# Patient Record
Sex: MALE | Race: WHITE | NOT HISPANIC OR LATINO | Employment: OTHER | URBAN - METROPOLITAN AREA
[De-identification: names, ages, dates, MRNs, and addresses within clinical notes are randomized per-mention and may not be internally consistent; named-entity substitution may affect disease eponyms.]

---

## 2017-11-25 ENCOUNTER — APPOINTMENT (OUTPATIENT)
Dept: CT IMAGING | Facility: HOSPITAL | Age: 77
End: 2017-11-25

## 2017-11-25 ENCOUNTER — APPOINTMENT (OUTPATIENT)
Dept: GENERAL RADIOLOGY | Facility: HOSPITAL | Age: 77
End: 2017-11-25

## 2017-11-25 ENCOUNTER — HOSPITAL ENCOUNTER (EMERGENCY)
Facility: HOSPITAL | Age: 77
Discharge: HOME | End: 2017-11-25
Attending: EMERGENCY MEDICINE | Admitting: EMERGENCY MEDICINE

## 2017-11-25 VITALS
SYSTOLIC BLOOD PRESSURE: 177 MMHG | TEMPERATURE: 98.1 F | HEIGHT: 70 IN | HEART RATE: 118 BPM | WEIGHT: 148 LBS | BODY MASS INDEX: 21.19 KG/M2 | OXYGEN SATURATION: 97 % | RESPIRATION RATE: 18 BRPM | DIASTOLIC BLOOD PRESSURE: 105 MMHG

## 2017-11-25 DIAGNOSIS — E87.20 LACTIC ACIDOSIS: ICD-10-CM

## 2017-11-25 DIAGNOSIS — K59.00 OBSTIPATION: Primary | ICD-10-CM

## 2017-11-25 DIAGNOSIS — D72.829 LEUKOCYTOSIS, UNSPECIFIED TYPE: ICD-10-CM

## 2017-11-25 DIAGNOSIS — R73.9 HYPERGLYCEMIA: ICD-10-CM

## 2017-11-25 LAB
ALBUMIN SERPL-MCNC: 4.5 G/DL (ref 3.2–4.8)
ALBUMIN/GLOB SERPL: 1.4 G/DL (ref 1.5–2.5)
ALP SERPL-CCNC: 94 U/L (ref 25–100)
ALT SERPL W P-5'-P-CCNC: 14 U/L (ref 7–40)
ANION GAP SERPL CALCULATED.3IONS-SCNC: 10 MMOL/L (ref 3–11)
AST SERPL-CCNC: 15 U/L (ref 0–33)
BACTERIA UR QL AUTO: ABNORMAL /HPF
BASOPHILS # BLD AUTO: 0.05 10*3/MM3 (ref 0–0.2)
BASOPHILS NFR BLD AUTO: 0.5 % (ref 0–1)
BILIRUB SERPL-MCNC: 0.6 MG/DL (ref 0.3–1.2)
BILIRUB UR QL STRIP: NEGATIVE
BUN BLD-MCNC: 11 MG/DL (ref 9–23)
BUN/CREAT SERPL: 10 (ref 7–25)
CALCIUM SPEC-SCNC: 8.8 MG/DL (ref 8.7–10.4)
CHLORIDE SERPL-SCNC: 101 MMOL/L (ref 99–109)
CLARITY UR: CLEAR
CO2 SERPL-SCNC: 27 MMOL/L (ref 20–31)
COLOR UR: YELLOW
CREAT BLD-MCNC: 1.1 MG/DL (ref 0.6–1.3)
D-LACTATE SERPL-SCNC: 2.8 MMOL/L (ref 0.5–2)
DEPRECATED RDW RBC AUTO: 46.8 FL (ref 37–54)
EOSINOPHIL # BLD AUTO: 0.01 10*3/MM3 (ref 0–0.3)
EOSINOPHIL NFR BLD AUTO: 0.1 % (ref 0–3)
ERYTHROCYTE [DISTWIDTH] IN BLOOD BY AUTOMATED COUNT: 14.3 % (ref 11.3–14.5)
GFR SERPL CREATININE-BSD FRML MDRD: 65 ML/MIN/1.73
GLOBULIN UR ELPH-MCNC: 3.2 GM/DL
GLUCOSE BLD-MCNC: 164 MG/DL (ref 70–100)
GLUCOSE UR STRIP-MCNC: NEGATIVE MG/DL
HCT VFR BLD AUTO: 46.7 % (ref 38.9–50.9)
HGB BLD-MCNC: 15.1 G/DL (ref 13.1–17.5)
HGB UR QL STRIP.AUTO: NEGATIVE
HOLD SPECIMEN: NORMAL
HYALINE CASTS UR QL AUTO: ABNORMAL /LPF
IMM GRANULOCYTES # BLD: 0.04 10*3/MM3 (ref 0–0.03)
IMM GRANULOCYTES NFR BLD: 0.4 % (ref 0–0.6)
KETONES UR QL STRIP: ABNORMAL
LEUKOCYTE ESTERASE UR QL STRIP.AUTO: NEGATIVE
LIPASE SERPL-CCNC: 35 U/L (ref 6–51)
LYMPHOCYTES # BLD AUTO: 1.87 10*3/MM3 (ref 0.6–4.8)
LYMPHOCYTES NFR BLD AUTO: 16.9 % (ref 24–44)
MCH RBC QN AUTO: 28.8 PG (ref 27–31)
MCHC RBC AUTO-ENTMCNC: 32.3 G/DL (ref 32–36)
MCV RBC AUTO: 89.1 FL (ref 80–99)
MONOCYTES # BLD AUTO: 0.55 10*3/MM3 (ref 0–1)
MONOCYTES NFR BLD AUTO: 5 % (ref 0–12)
NEUTROPHILS # BLD AUTO: 8.56 10*3/MM3 (ref 1.5–8.3)
NEUTROPHILS NFR BLD AUTO: 77.1 % (ref 41–71)
NITRITE UR QL STRIP: NEGATIVE
PH UR STRIP.AUTO: 7.5 [PH] (ref 5–8)
PLATELET # BLD AUTO: 243 10*3/MM3 (ref 150–450)
PMV BLD AUTO: 8.7 FL (ref 6–12)
POTASSIUM BLD-SCNC: 4.4 MMOL/L (ref 3.5–5.5)
PROT SERPL-MCNC: 7.7 G/DL (ref 5.7–8.2)
PROT UR QL STRIP: ABNORMAL
RBC # BLD AUTO: 5.24 10*6/MM3 (ref 4.2–5.76)
RBC # UR: ABNORMAL /HPF
REF LAB TEST METHOD: ABNORMAL
SODIUM BLD-SCNC: 138 MMOL/L (ref 132–146)
SP GR UR STRIP: 1.01 (ref 1–1.03)
SQUAMOUS #/AREA URNS HPF: ABNORMAL /HPF
UROBILINOGEN UR QL STRIP: ABNORMAL
WBC NRBC COR # BLD: 11.08 10*3/MM3 (ref 3.5–10.8)
WBC UR QL AUTO: ABNORMAL /HPF
WHOLE BLOOD HOLD SPECIMEN: NORMAL
WHOLE BLOOD HOLD SPECIMEN: NORMAL

## 2017-11-25 PROCEDURE — 80053 COMPREHEN METABOLIC PANEL: CPT | Performed by: EMERGENCY MEDICINE

## 2017-11-25 PROCEDURE — 85025 COMPLETE CBC W/AUTO DIFF WBC: CPT | Performed by: EMERGENCY MEDICINE

## 2017-11-25 PROCEDURE — 83690 ASSAY OF LIPASE: CPT | Performed by: EMERGENCY MEDICINE

## 2017-11-25 PROCEDURE — 0 IOPAMIDOL 61 % SOLUTION: Performed by: EMERGENCY MEDICINE

## 2017-11-25 PROCEDURE — 74022 RADEX COMPL AQT ABD SERIES: CPT

## 2017-11-25 PROCEDURE — 99284 EMERGENCY DEPT VISIT MOD MDM: CPT

## 2017-11-25 PROCEDURE — 0 DIATRIZOATE MEGLUMINE & SODIUM PER 1 ML: Performed by: EMERGENCY MEDICINE

## 2017-11-25 PROCEDURE — 74177 CT ABD & PELVIS W/CONTRAST: CPT

## 2017-11-25 PROCEDURE — 0 DIATRIZOATE MEGLUMINE & SODIUM PER 1 ML

## 2017-11-25 PROCEDURE — 96360 HYDRATION IV INFUSION INIT: CPT

## 2017-11-25 PROCEDURE — 81001 URINALYSIS AUTO W/SCOPE: CPT | Performed by: EMERGENCY MEDICINE

## 2017-11-25 PROCEDURE — 83605 ASSAY OF LACTIC ACID: CPT | Performed by: EMERGENCY MEDICINE

## 2017-11-25 RX ORDER — SODIUM CHLORIDE 9 MG/ML
125 INJECTION, SOLUTION INTRAVENOUS CONTINUOUS
Status: DISCONTINUED | OUTPATIENT
Start: 2017-11-25 | End: 2017-11-25

## 2017-11-25 RX ORDER — SODIUM CHLORIDE 0.9 % (FLUSH) 0.9 %
10 SYRINGE (ML) INJECTION AS NEEDED
Status: DISCONTINUED | OUTPATIENT
Start: 2017-11-25 | End: 2017-11-25 | Stop reason: HOSPADM

## 2017-11-25 RX ORDER — ONDANSETRON 8 MG/1
8 TABLET, ORALLY DISINTEGRATING ORAL EVERY 8 HOURS PRN
Qty: 10 TABLET | Refills: 0 | Status: SHIPPED | OUTPATIENT
Start: 2017-11-25 | End: 2018-01-25

## 2017-11-25 RX ORDER — GLIPIZIDE 10 MG/1
10 TABLET ORAL DAILY
COMMUNITY

## 2017-11-25 RX ORDER — AMOXAPINE 50 MG/1
150 TABLET ORAL NIGHTLY
COMMUNITY
End: 2018-08-10 | Stop reason: DRUGHIGH

## 2017-11-25 RX ADMIN — DIATRIZOATE MEGLUMINE AND DIATRIZOATE SODIUM 15 ML: 660; 100 LIQUID ORAL; RECTAL at 16:05

## 2017-11-25 RX ADMIN — DIATRIZOATE MEGLUMINE AND DIATRIZOATE SODIUM 15 ML: 660; 100 LIQUID ORAL; RECTAL at 15:04

## 2017-11-25 RX ADMIN — SODIUM CHLORIDE 1000 ML: 9 INJECTION, SOLUTION INTRAVENOUS at 15:51

## 2017-11-25 RX ADMIN — IOPAMIDOL 85 ML: 612 INJECTION, SOLUTION INTRAVENOUS at 17:15

## 2017-11-25 NOTE — ED PROVIDER NOTES
Subjective   HPI Comments: Alexys Clancy is a 76 y.o.male who presents to the ED with c/o constipation with onset 10 days ago. He reports that he has tried taking Mag Citrate x3 bottles, Dulcolax x18 tabs, Castor oil, and a large jug of Golytely without any relief of his symptoms. The patient notes that he visited his PCP yesterday. He states that he has been experiencing a decreased appetite but denies fevers, nausea, vomiting, diarrhea, abnormal urinary symptoms, distention, or any other complaints at this time. The patient notes that he has a history of chronic constipation. The patient reports having several colonoscopies by Dr. Li, with the most recent showing clear results.    Patient is a 76 y.o. male presenting with constipation.   History provided by:  Patient  Constipation   Severity:  Moderate  Time since last bowel movement: 10 days.  Timing:  Constant  Progression:  Unchanged  Chronicity:  Chronic  Stool description:  None produced  Relieved by:  Nothing  Worsened by:  Nothing  Ineffective treatments: Mag Citrate x3 bottles, Dulcolax x18 tabs, Castor oil, and a large jug of Golytely.  Associated symptoms: no abdominal pain, no diarrhea, no dysuria, no fever, no nausea and no vomiting        Review of Systems   Constitutional: Positive for appetite change (Decreased). Negative for fever.   Gastrointestinal: Positive for constipation. Negative for abdominal distention, abdominal pain, diarrhea, nausea and vomiting.   Genitourinary: Negative for dysuria, frequency, hematuria and urgency.   All other systems reviewed and are negative.      Past Medical History:   Diagnosis Date   • Constipation    • Diabetes mellitus    • Enlarged prostate    • Skin cancer     left ear       No Known Allergies    Past Surgical History:   Procedure Laterality Date   • CYSTOSCOPY TRANSURETHRAL RESECTION OF PROSTATE     • EYE SURGERY     • HERNIA REPAIR     • UVULOPALATOPHARYNGOPLASTY         History reviewed. No pertinent  family history.    Social History     Social History   • Marital status:      Spouse name: N/A   • Number of children: N/A   • Years of education: N/A     Social History Main Topics   • Smoking status: Current Every Day Smoker     Packs/day: 0.50     Types: Cigarettes   • Smokeless tobacco: Never Used   • Alcohol use No   • Drug use: No   • Sexual activity: Defer     Other Topics Concern   • None     Social History Narrative   • None         Objective   Physical Exam   Constitutional: He is oriented to person, place, and time. He appears well-developed and well-nourished. No distress.   HENT:   Head: Normocephalic and atraumatic.   Nose: Nose normal.   Well hydrated. Pharynx is benign.   Eyes: Conjunctivae are normal. No scleral icterus.   Neck: Normal range of motion. Neck supple.   Cardiovascular: Normal rate, regular rhythm and normal heart sounds.  Exam reveals no gallop and no friction rub.    No murmur heard.  Pulmonary/Chest: Effort normal and breath sounds normal. No respiratory distress.   Abdominal: Soft. Bowel sounds are normal. He exhibits no distension and no mass. There is no tenderness. There is no rebound and no guarding.   No palpable fecal mass. Bowel sounds are great and intact bilaterally.   Genitourinary: Rectum normal.   Genitourinary Comments: Normal rectal tone. No masses or lesions. No palpable stool.   Musculoskeletal: Normal range of motion. He exhibits no tenderness.   Neurological: He is alert and oriented to person, place, and time.   Skin: Skin is warm and dry.   Psychiatric: He has a normal mood and affect. His behavior is normal.   Nursing note and vitals reviewed.      Procedures         ED Course  ED Course   Comment By Time   Patient is serially reevaluated throughout the ED course with last reevaluation now.  His lactate is elevated 2.8.  He had 40 ketones in his urine with glucose 164 no acidosis on chem profile.  White blood count is mildly elevated 11,000, but unchanged  "from previous.  He has a history of chronic leukocytosis but was told by Dr. López that is \"blood was fine\" and he needed no further evaluation for his chronic leukocytosis.Patient is voided well since IV hydration in the ED.  CT is pending, as he had no results from an enema, and no clear constipation on acute abdominal series.  He did have a fiber nodule pattern in the bases of his lungs that we'll further evaluate with CT additionally.Patient's primary care physician is Dr. Faith, and he also sees Dr. Li for colorectal care and his colonoscopies.Updated the patient and spouse with the plan of care.  All agree. Charles Hill MD 11/25 1710   Radiograph and CTs are reviewed with Dr. Taylor.  CT the abdomen and pelvis are unremarkable with both IV and oral contrast, with no stool visible, no signs of inflammation, and no other intra-abdominal or intrapelvic abnormality visualized.  The chest x-ray however is significant only change from 3 years ago with a fibroid nodule pattern in the base of the lungs, consistent with pulmonary fibrosis.The patient's reexamined with no abnormal tenderness no guarding rebound or peritoneal findings.  He reports that he still has the feeling of constipation but no other discomfort.  His lactate was mildly elevated and I ketones were elevated but he reports taking only the gallon of GoLYTELY yesterday with no other liquids that would actually hydrate him, and his had 3 cups of coffee but no other liquids today.  He denies any fevers chills or urinary symptoms.     I discussed hydration, follow-up with his PCP on Monday or Tuesday, and pulmonary follow-up with Dr. Vázquez who has seen him several years ago but not since.  I discussed the importance of smoking cessation and most importantly immediately return to the ED if symptoms worsen or do not progressively resolved after cessation of laxative use.Very pleasant and responsible patient and spouse verbalized understanding " agreement with the plan of care Charles Hill MD 11/25 1749     Recent Results (from the past 24 hour(s))   Comprehensive Metabolic Panel    Collection Time: 11/25/17  2:54 PM   Result Value Ref Range    Glucose 164 (H) 70 - 100 mg/dL    BUN 11 9 - 23 mg/dL    Creatinine 1.10 0.60 - 1.30 mg/dL    Sodium 138 132 - 146 mmol/L    Potassium 4.4 3.5 - 5.5 mmol/L    Chloride 101 99 - 109 mmol/L    CO2 27.0 20.0 - 31.0 mmol/L    Calcium 8.8 8.7 - 10.4 mg/dL    Total Protein 7.7 5.7 - 8.2 g/dL    Albumin 4.50 3.20 - 4.80 g/dL    ALT (SGPT) 14 7 - 40 U/L    AST (SGOT) 15 0 - 33 U/L    Alkaline Phosphatase 94 25 - 100 U/L    Total Bilirubin 0.6 0.3 - 1.2 mg/dL    eGFR Non African Amer 65 >60 mL/min/1.73    Globulin 3.2 gm/dL    A/G Ratio 1.4 (L) 1.5 - 2.5 g/dL    BUN/Creatinine Ratio 10.0 7.0 - 25.0    Anion Gap 10.0 3.0 - 11.0 mmol/L   Lipase    Collection Time: 11/25/17  2:54 PM   Result Value Ref Range    Lipase 35 6 - 51 U/L   Lactic Acid, Plasma    Collection Time: 11/25/17  2:54 PM   Result Value Ref Range    Lactate 2.8 (C) 0.5 - 2.0 mmol/L   CBC Auto Differential    Collection Time: 11/25/17  2:54 PM   Result Value Ref Range    WBC 11.08 (H) 3.50 - 10.80 10*3/mm3    RBC 5.24 4.20 - 5.76 10*6/mm3    Hemoglobin 15.1 13.1 - 17.5 g/dL    Hematocrit 46.7 38.9 - 50.9 %    MCV 89.1 80.0 - 99.0 fL    MCH 28.8 27.0 - 31.0 pg    MCHC 32.3 32.0 - 36.0 g/dL    RDW 14.3 11.3 - 14.5 %    RDW-SD 46.8 37.0 - 54.0 fl    MPV 8.7 6.0 - 12.0 fL    Platelets 243 150 - 450 10*3/mm3    Neutrophil % 77.1 (H) 41.0 - 71.0 %    Lymphocyte % 16.9 (L) 24.0 - 44.0 %    Monocyte % 5.0 0.0 - 12.0 %    Eosinophil % 0.1 0.0 - 3.0 %    Basophil % 0.5 0.0 - 1.0 %    Immature Grans % 0.4 0.0 - 0.6 %    Neutrophils, Absolute 8.56 (H) 1.50 - 8.30 10*3/mm3    Lymphocytes, Absolute 1.87 0.60 - 4.80 10*3/mm3    Monocytes, Absolute 0.55 0.00 - 1.00 10*3/mm3    Eosinophils, Absolute 0.01 0.00 - 0.30 10*3/mm3    Basophils, Absolute 0.05 0.00 - 0.20 10*3/mm3     Immature Grans, Absolute 0.04 (H) 0.00 - 0.03 10*3/mm3   Light Blue Top    Collection Time: 11/25/17  2:54 PM   Result Value Ref Range    Extra Tube hold for add-on    Green Top (Gel)    Collection Time: 11/25/17  2:54 PM   Result Value Ref Range    Extra Tube Hold for add-ons.    Lavender Top    Collection Time: 11/25/17  2:54 PM   Result Value Ref Range    Extra Tube hold for add-on    Gold Top - SST    Collection Time: 11/25/17  2:54 PM   Result Value Ref Range    Extra Tube Hold for add-ons.    Urinalysis With / Culture If Indicated - Urine, Clean Catch    Collection Time: 11/25/17  2:55 PM   Result Value Ref Range    Color, UA Yellow Yellow, Straw    Appearance, UA Clear Clear    pH, UA 7.5 5.0 - 8.0    Specific Gravity, UA 1.008 1.001 - 1.030    Glucose, UA Negative Negative    Ketones, UA 40 mg/dL (2+) (A) Negative    Bilirubin, UA Negative Negative    Blood, UA Negative Negative    Protein, UA 30 mg/dL (1+) (A) Negative    Leuk Esterase, UA Negative Negative    Nitrite, UA Negative Negative    Urobilinogen, UA 0.2 E.U./dL 0.2 - 1.0 E.U./dL   Urinalysis, Microscopic Only - Urine, Clean Catch    Collection Time: 11/25/17  2:55 PM   Result Value Ref Range    RBC, UA 0-2 None Seen, 0-2 /HPF    WBC, UA 0-2 (A) None Seen /HPF    Bacteria, UA None Seen None Seen, Trace /HPF    Squamous Epithelial Cells, UA None Seen None Seen, 0-2 /HPF    Hyaline Casts, UA None Seen 0 - 6 /LPF    Methodology Automated Microscopy      Note: In addition to lab results from this visit, the labs listed above may include labs taken at another facility or during a different encounter within the last 24 hours. Please correlate lab times with ED admission and discharge times for further clarification of the services performed during this visit.    CT Abdomen Pelvis With Contrast   Final Result   1. Basilar bilateral pulmonary fibrosis.   2. Negative images of the abdomen with no evidence of fecal stasis or   obstruction.       DICTATED:     " 11/25/2017   EDITED:          11/25/2017           This report was finalized on 11/25/2017 5:50 PM by Dr. Forest Taylor MD.          XR Abdomen 2 View With Chest 1 View   Final Result   There is a bibasilar fibronodular interstitial pulmonary   pattern which was not present on a previous chest x-ray dated   12/16/2014. The bowel gas pattern is unremarkable with no evidence of   mechanical bowel obstruction or perforation.       DICTATED:     11/25/2017   EDITED:          11/25/2017           This report was finalized on 11/25/2017 4:56 PM by Dr. Forest Taylor MD.            Vitals:    11/25/17 1400 11/25/17 1430 11/25/17 1735   BP: (!) 209/100 164/90 171/97   BP Location: Left arm     Patient Position: Sitting     Pulse: 118     Resp: 18     Temp: 98.1 °F (36.7 °C)     TempSrc: Oral     SpO2: 97% 97% 98%   Weight: 148 lb (67.1 kg)     Height: 70\" (177.8 cm)       Medications   sodium chloride 0.9 % flush 10 mL (not administered)   sodium chloride 0.9 % infusion (not administered)   diatrizoate meglumine-sodium (GASTROGRAFIN) 66-10 % solution 15 mL (15 mL Rectal Given 11/25/17 1504)   sodium chloride 0.9 % bolus 1,000 mL (0 mL Intravenous Stopped 11/25/17 1704)   diatrizoate meglumine-sodium (GASTROGRAFIN) 66-10 % solution 15 mL (15 mL Oral Given 11/25/17 1605)   iopamidol (ISOVUE-300) 61 % injection 85 mL (85 mL Intravenous Given 11/25/17 1715)     ECG/EMG Results (last 24 hours)     ** No results found for the last 24 hours. **                        MDM    Final diagnoses:   Obstipation   Lactic acidosis   Hyperglycemia   Leukocytosis, unspecified type       Documentation assistance provided by ash Shae.  Information recorded by the scribe was done at my direction and has been verified and validated by me.     Teressa Shea  11/25/17 1353       Charles Hill MD  11/25/17 3460    "

## 2017-11-25 NOTE — DISCHARGE INSTRUCTIONS
Do not use any laxatives for the next 6 days.  Push plenty fluids.  Use Zofran as needed for nausea    Follow-up with Dr. Faith and 48-72 hours for mandatory reevaluation and further care as needed.  Follow-up more promptly if your symptoms worsen or do not improve    Push clear liquids for the next 24 hours    Follow-up with Dr. Li if you have any persistent GI symptoms, for colorectal evaluation    Follow-up with Dr. Vázquez for pulmonary evaluation in view of the worsening findings on your x-ray from 3 years ago.    Stop smoking completely today.  He may use over-the-counter NicoDerm patches, Nicorette gum, and you may discuss additional medications with your primary care physician    Return to the ED if you have any significant worsening of your symptoms including onset of abdominal pain, fever with pain, or any other significant, acute, urgent, emergent, or progressive symptoms as discussed

## 2018-01-25 ENCOUNTER — OFFICE VISIT (OUTPATIENT)
Dept: PULMONOLOGY | Facility: CLINIC | Age: 78
End: 2018-01-25

## 2018-01-25 VITALS
SYSTOLIC BLOOD PRESSURE: 118 MMHG | HEIGHT: 68 IN | TEMPERATURE: 97.4 F | WEIGHT: 151 LBS | HEART RATE: 104 BPM | OXYGEN SATURATION: 98 % | BODY MASS INDEX: 22.88 KG/M2 | DIASTOLIC BLOOD PRESSURE: 70 MMHG

## 2018-01-25 DIAGNOSIS — R94.2 DECREASED DIFFUSION CAPACITY: ICD-10-CM

## 2018-01-25 DIAGNOSIS — R06.02 SOB (SHORTNESS OF BREATH): Primary | ICD-10-CM

## 2018-01-25 DIAGNOSIS — R93.89 ABNORMAL CHEST CT: ICD-10-CM

## 2018-01-25 DIAGNOSIS — J44.9 COPD MIXED TYPE (HCC): ICD-10-CM

## 2018-01-25 DIAGNOSIS — Z72.0 TOBACCO ABUSE: ICD-10-CM

## 2018-01-25 PROCEDURE — 94726 PLETHYSMOGRAPHY LUNG VOLUMES: CPT | Performed by: INTERNAL MEDICINE

## 2018-01-25 PROCEDURE — 94618 PULMONARY STRESS TESTING: CPT | Performed by: INTERNAL MEDICINE

## 2018-01-25 PROCEDURE — 94729 DIFFUSING CAPACITY: CPT | Performed by: INTERNAL MEDICINE

## 2018-01-25 PROCEDURE — 94375 RESPIRATORY FLOW VOLUME LOOP: CPT | Performed by: INTERNAL MEDICINE

## 2018-01-25 PROCEDURE — 99204 OFFICE O/P NEW MOD 45 MIN: CPT | Performed by: INTERNAL MEDICINE

## 2018-01-25 NOTE — PROGRESS NOTES
PULMONARY  NOTE    Chief Complaint     Abnormal CT scan of the chest, tobacco abuse    History of Present Illness     77-year-old white male referred for an abnormal CT scan of the abdomen and pelvis which included the bases of his lungs.  That film done in November 2017 was reported as showing pulmonary fibrotic disease.    The patient was experiencing severe constipation went to the emergency room in November.  He had a CT scan of the abdomen and pelvis.  The radiologist interpreted the bases of the lungs is showing fibrotic lung disease.  He is subsequently been referred here.    The patient has a long history tobacco abuse.  He used to smoke 2 packs of cigarettes per day but he is now cut down to 6 cigarettes a day.    He was seen Dr. Vázquez in the past in her office. Was somewhere around 2014.  He was diagnosed with emphysema at that time and prescribed an inhaler but he never tried the inhaler.    He doesn't feel limited by dyspnea on a daily basis.  He does well on a level surface.  He also feels that he can go up over a flight of stairs without having to stop due to shortness of breath.    He doesn't experience cough on a daily basis and has had no hemoptysis.    Patient Active Problem List   Diagnosis   • Emphysema with well preserved FEV1   • Tobacco abuse   • Abnormal chest CT   • Decreased diffusion capacity probably related to emphysema     No Known Allergies    Current Outpatient Prescriptions:   •  amoxapine (ASENDIN) 50 MG tablet, Take 150 mg by mouth Every Night., Disp: , Rfl:   •  glipiZIDE (GLUCOTROL) 10 MG tablet, Take 10 mg by mouth Daily., Disp: , Rfl:   •  metFORMIN (GLUCOPHAGE) 1000 MG tablet, Take 2,000 mg by mouth 2 (Two) Times a Day With Meals., Disp: , Rfl:   •  SITagliptin (JANUVIA) 100 MG tablet, Take 100 mg by mouth Daily., Disp: , Rfl:   MEDICATION LIST AND ALLERGIES REVIEWED.    Family History   Problem Relation Age of Onset   • Kidney disease Mother    • Parkinsonism Father    •  "Parkinsonism Sister    • Stroke Maternal Grandmother    • Stroke Maternal Grandfather      Social History   Substance Use Topics   • Smoking status: Current Every Day Smoker     Packs/day: 0.50     Types: Cigarettes   • Smokeless tobacco: Never Used   • Alcohol use No     Social History     Social History Narrative        Is a retired     Has smoked up to 2 packs of cigarettes per day but has cut back to 6 cigarettes a day    Doesn't drink alcohol     FAMILY AND SOCIAL HISTORY REVIEWED.    Review of Systems  ALSO REFER TO SCANNED ROS SHEET FROM SAME DATE.    /70 (BP Location: Right arm, Patient Position: Sitting, Cuff Size: Adult)  Pulse 104  Temp 97.4 °F (36.3 °C)  Ht 172.7 cm (68\")  Wt 68.5 kg (151 lb)  SpO2 98%  BMI 22.96 kg/m2  Physical Exam   Constitutional: He is oriented to person, place, and time. He appears well-developed. No distress.   HENT:   Head: Normocephalic and atraumatic.   Neck: No thyromegaly present.   Cardiovascular: Normal rate, regular rhythm and normal heart sounds.    No murmur heard.  Pulmonary/Chest: Effort normal. No stridor.   Decreased breath sounds bilaterally without wheezes and only a few crackles   Abdominal: Soft. Bowel sounds are normal.   Musculoskeletal: Normal range of motion.   Trace bilateral lower extremity edema.  Borderline clubbing   Lymphadenopathy:     He has no cervical adenopathy.        Right: No supraclavicular and no epitrochlear adenopathy present.        Left: No supraclavicular and no epitrochlear adenopathy present.   Neurological: He is alert and oriented to person, place, and time.   Skin: Skin is warm and dry. He is not diaphoretic.   Psychiatric: He has a normal mood and affect. His behavior is normal.   Nursing note and vitals reviewed.      Results     CT scan of the chest from the [facility] dated February 2014 was reviewed on PACS.  Diffuse emphysematous changes were noted without asked, " adenopathy, or overt fibrotic changes.    CT scan of the abdomen and pelvis from [facility] 11/25/2017 revealed no acute intra-abdominal process.  Bases of the lungs read demonstrated diffuse emphysematous changes and some fibrotic changes but not typical changes for IPF    PFTs reveal no airway obstruction, no restriction, and a reduced diffusion capacity.  However, peak expiratory effort was submaximal    Exercise pulse oximetry in the office failed to show significant excised induced desaturation to the point that would require supplemental oxygen    Problem List       ICD-10-CM ICD-9-CM   1. SOB (shortness of breath) R06.02 786.05   2. Emphysema with well preserved FEV1 J44.9 496   3. Tobacco abuse Z72.0 305.1   4. Abnormal chest CT R93.8 793.2   5. Decreased diffusion capacity probably related to emphysema R94.2 794.2       Discussion     We reviewed his test results.  They were kind enough to bring a CT scan of the chest from [facility] from February 2014 for comparison.  I don't really see any difference between the bases of his lungs on his recent CT scan of the abdomen and the CT scan of the chest from 2014.  The most remarkable finding is scattered, quite diffuse, emphysematous changes.  He does not have typical changes suggestive of IPF such as septal thickening or peripheral honeycombing.  Given his tobacco abuse history and concerns about interstitial lung disease, I have recommended both a low-dose CT scan of the chest and a high-resolution CT scan of the chest.    We did exercise pulse oximetry which failed to show significant exercise-induced desaturation.    We discussed the need for total smoking abstinence and we discussed smoking cessation strategies.    We will get an alpha 1 antitrypsin screen today.    In the past, he was given an inhaler by Dr. Vázquez but he never used it.  We talked about medical therapy again today.  He is not really interested in being on  an inhaler at this point.    I'll plan to see him back in 1-2 months after the above CT scan.    Jay Callahan MD  Note electronically signed    CC: Keo Faith MD

## 2018-01-29 ENCOUNTER — HOSPITAL ENCOUNTER (OUTPATIENT)
Dept: CT IMAGING | Facility: HOSPITAL | Age: 78
Discharge: HOME | End: 2018-01-29
Attending: INTERNAL MEDICINE | Admitting: INTERNAL MEDICINE

## 2018-01-29 DIAGNOSIS — R06.02 SOB (SHORTNESS OF BREATH): ICD-10-CM

## 2018-01-29 PROCEDURE — 71250 CT THORAX DX C-: CPT

## 2018-01-30 DIAGNOSIS — R91.8 LUNG MASS: Primary | ICD-10-CM

## 2018-01-31 NOTE — PROGRESS NOTES
HRCT reveals a LLL pleural based lesion that is larger than it was just a few months ago.   It is not in a location that can be biopsied easily. Even CECILIA bronchoscopy would be very difficult.   Dr Callahan has recommended a PET/CT for risk stratification.

## 2018-02-05 ENCOUNTER — HOSPITAL ENCOUNTER (OUTPATIENT)
Dept: PET IMAGING | Facility: HOSPITAL | Age: 78
Discharge: HOME | End: 2018-02-05
Attending: INTERNAL MEDICINE | Admitting: INTERNAL MEDICINE

## 2018-02-05 ENCOUNTER — HOSPITAL ENCOUNTER (OUTPATIENT)
Dept: PET IMAGING | Facility: HOSPITAL | Age: 78
Discharge: HOME | End: 2018-02-05
Attending: INTERNAL MEDICINE

## 2018-02-05 DIAGNOSIS — R91.8 LUNG MASS: ICD-10-CM

## 2018-02-05 LAB
GLUCOSE BLDC GLUCOMTR-MCNC: 189 MG/DL (ref 70–130)
GLUCOSE BLDC GLUCOMTR-MCNC: 209 MG/DL (ref 70–130)
GLUCOSE BLDC GLUCOMTR-MCNC: 219 MG/DL (ref 70–130)

## 2018-02-05 PROCEDURE — 82962 GLUCOSE BLOOD TEST: CPT

## 2018-02-05 PROCEDURE — 0 FLUDEOXYGLUCOSE F18 SOLUTION: Performed by: INTERNAL MEDICINE

## 2018-02-05 PROCEDURE — A9552 F18 FDG: HCPCS | Performed by: INTERNAL MEDICINE

## 2018-02-05 PROCEDURE — 78816 PET IMAGE W/CT FULL BODY: CPT

## 2018-02-05 RX ADMIN — FLUDEOXYGLUCOSE F18 1 DOSE: 300 INJECTION INTRAVENOUS at 13:33

## 2018-02-08 ENCOUNTER — TELEPHONE (OUTPATIENT)
Dept: PULMONOLOGY | Facility: CLINIC | Age: 78
End: 2018-02-08

## 2018-02-08 NOTE — TELEPHONE ENCOUNTER
PET/CT scan revealed no hypermetabolic activity, particularly in the area of the pulmonary nodule.  At this point, I would recommend a low-dose CT scan of the chest in 6 months.  I discussed these results with the patient on the phone who understands and agrees to come back in about 6 months.  A last my office staff to make the arrangements.

## 2018-02-09 DIAGNOSIS — R91.8 LUNG MASS: ICD-10-CM

## 2018-02-09 DIAGNOSIS — R93.89 ABNORMAL CHEST CT: Primary | ICD-10-CM

## 2018-02-09 NOTE — TELEPHONE ENCOUNTER
March appointment canceled.  Patient must be asymptomatic to meet criteria for low dose CT. Since he has a known lung mass, I've ordered a CT without contrast for 6 months.

## 2018-08-04 ENCOUNTER — HOSPITAL ENCOUNTER (OUTPATIENT)
Dept: CT IMAGING | Facility: HOSPITAL | Age: 78
Discharge: HOME | End: 2018-08-04
Admitting: NURSE PRACTITIONER

## 2018-08-04 DIAGNOSIS — R91.8 LUNG MASS: ICD-10-CM

## 2018-08-04 DIAGNOSIS — R93.89 ABNORMAL CHEST CT: ICD-10-CM

## 2018-08-04 PROCEDURE — 71250 CT THORAX DX C-: CPT

## 2018-08-10 ENCOUNTER — OFFICE VISIT (OUTPATIENT)
Dept: PULMONOLOGY | Facility: CLINIC | Age: 78
End: 2018-08-10

## 2018-08-10 VITALS
HEIGHT: 68 IN | SYSTOLIC BLOOD PRESSURE: 136 MMHG | HEART RATE: 99 BPM | WEIGHT: 145.5 LBS | TEMPERATURE: 97.3 F | BODY MASS INDEX: 22.05 KG/M2 | OXYGEN SATURATION: 97 % | RESPIRATION RATE: 18 BRPM | DIASTOLIC BLOOD PRESSURE: 70 MMHG

## 2018-08-10 DIAGNOSIS — Z72.0 TOBACCO ABUSE: ICD-10-CM

## 2018-08-10 DIAGNOSIS — J44.9 COPD MIXED TYPE (HCC): Primary | ICD-10-CM

## 2018-08-10 DIAGNOSIS — R93.89 ABNORMAL CHEST CT: ICD-10-CM

## 2018-08-10 PROCEDURE — 99214 OFFICE O/P EST MOD 30 MIN: CPT | Performed by: NURSE PRACTITIONER

## 2018-08-10 RX ORDER — ARIPIPRAZOLE 2 MG/1
2 TABLET ORAL DAILY
COMMUNITY
Start: 2018-08-09 | End: 2020-03-19

## 2018-08-10 RX ORDER — AMOXAPINE 100 MG/1
100 TABLET ORAL 3 TIMES DAILY
COMMUNITY
Start: 2018-08-09

## 2018-08-10 RX ORDER — METFORMIN HYDROCHLORIDE 500 MG/1
500 TABLET, EXTENDED RELEASE ORAL 2 TIMES DAILY
COMMUNITY
Start: 2018-06-25

## 2018-08-10 NOTE — PROGRESS NOTES
[facility] Pulmonary Follow up    CHIEF COMPLAINT    Emphysema, tobacco abuse, lung nodule    HISTORY OF PRESENT ILLNESS    Alexys Clancy is a 77 y.o.male current smoker with emphysema and known lung nodule here today for follow-up after CT scan.    He had a CT of abdomen and pelvis in November 2017 which demonstrated some basilar fibrotic disease.  He was evaluated by Dr. Callahan in January.  He underwent a high-resolution CT of the chest which revealed an enlarging left lower lobe pleural-based nodule.  PET scan in February was negative.  He recently underwent a 6 month follow-up CT of the chest and returns today to discuss results.    He has a significant smoking history.  He has smoked for almost 60 years upwards of 2 packs per day.  He is now smoking just 6 cigarettes per day but has been unsuccessful in total smoking abstinence.  Despite his long-standing tobacco abuse, he has preserved lung function.  He doesn't feel limited by dyspnea and is not interested in any inhaled therapy.    Patient Active Problem List   Diagnosis   • Emphysema with well preserved FEV1   • Tobacco abuse   • Abnormal chest CT   • Decreased diffusion capacity probably related to emphysema       No Known Allergies    Current Outpatient Prescriptions:   •  amoxapine (ASENDIN) 100 MG tablet, Take 100 mg by mouth 3 (Three) Times a Day., Disp: , Rfl:   •  ARIPiprazole (ABILIFY) 2 MG tablet, Take 2 mg by mouth Daily., Disp: , Rfl:   •  glipiZIDE (GLUCOTROL) 10 MG tablet, Take 10 mg by mouth Daily., Disp: , Rfl:   •  metFORMIN ER (GLUCOPHAGE-XR) 500 MG 24 hr tablet, Take 500 mg by mouth 2 (Two) Times a Day., Disp: , Rfl:   •  SITagliptin (JANUVIA) 100 MG tablet, Take 100 mg by mouth Daily., Disp: , Rfl:   MEDICATION LIST AND ALLERGIES REVIEWED.    Social History   Substance Use Topics   • Smoking status: Current Every Day Smoker     Packs/day: 0.50     Types: Cigarettes   • Smokeless tobacco: Never Used   • Alcohol use No       FAMILY AND SOCIAL  "HISTORY REVIEWED.    Review of Systems   Constitutional: Negative for chills, fatigue, fever and unexpected weight change.   HENT: Negative for congestion, nosebleeds, postnasal drip, rhinorrhea, sinus pressure and trouble swallowing.    Respiratory: Negative for cough, chest tightness, shortness of breath and wheezing.    Cardiovascular: Negative for chest pain and leg swelling.   Gastrointestinal: Negative for abdominal pain, constipation, diarrhea, nausea and vomiting.   Genitourinary: Negative for dysuria, frequency, hematuria and urgency.   Musculoskeletal: Negative for myalgias.   Neurological: Negative for dizziness, weakness, numbness and headaches.   All other systems reviewed and are negative.  .    /70 (BP Location: Left arm, Patient Position: Sitting, Cuff Size: Adult)   Pulse 99   Temp 97.3 °F (36.3 °C)   Resp 18   Ht 172.7 cm (68\")   Wt 66 kg (145 lb 8 oz)   SpO2 97%   BMI 22.12 kg/m²     Immunization History   Administered Date(s) Administered   • Flu Vaccine High Dose PF 65YR+ 09/29/2017   • Pneumococcal Polysaccharide (PPSV23) 10/01/2014       Physical Exam   Constitutional: He is oriented to person, place, and time. He appears well-developed. No distress.   HENT:   Head: Normocephalic and atraumatic.   Neck: No thyromegaly present.   Cardiovascular: Normal rate, regular rhythm and normal heart sounds.    No murmur heard.  Pulmonary/Chest: Effort normal. No stridor.   Decreased breath sounds bilaterally without wheezes and only a few crackles   Abdominal: Soft. Bowel sounds are normal.   Musculoskeletal: Normal range of motion.   Trace bilateral lower extremity edema.  Borderline clubbing   Lymphadenopathy:     He has no cervical adenopathy.        Right: No supraclavicular and no epitrochlear adenopathy present.        Left: No supraclavicular and no epitrochlear adenopathy present.   Neurological: He is alert and oriented to person, place, and time.   Skin: Skin is warm and dry. He is " not diaphoretic.   Psychiatric: He has a normal mood and affect. His behavior is normal.   Nursing note and vitals reviewed.        RESULTS    Ct Chest Without Contrast    Result Date: 8/4/2018  1. 14 mm noncalcified left lower lobe pulmonary nodule. Lung RADS Category 4B with recommended PET/CT and/or tissue sampling evaluation. 2. Enlarged AP window lymph node measuring 16 mm in long axis dimension. 3. Severe background emphysematous change of the lungs.  DICTATED:   8/4/2018 EDITED/ls :   8/4/2018  This report was finalized on 8/4/2018 6:10 PM by Dr. Timbo Daugherty.        PROBLEM LIST    Problem List Items Addressed This Visit        Respiratory    Emphysema with well preserved FEV1 - Primary       Other    Tobacco abuse    Relevant Orders    CT Chest Without Contrast    Abnormal chest CT    Relevant Orders    CT Chest Without Contrast            DISCUSSION    We reviewed his recent CT of the chest.  His 14 mm noncalcified left lower lobe pulmonary nodule is unchanged and was negative on PET/CT in February.  Will get a follow-up CT in 1 year.    I encouraged total smoking abstinence.  He is not interested in any pharmacological smoking cessation aids.    He is still not interested in any inhalers.    Follow-up with Dr. Callahan in one year after CT of chest.    I spent 25 minutes with the patient. I spent > 50% percent of this time counseling and discussing diagnosis, diagnostic testing, current status and treatment options.    FAROOQ Hernández  08/10/83675:12 PM  Electronically signed     Please note that portions of this note were completed with a voice recognition program. Efforts were made to edit the dictations, but occasionally words are mistranscribed.      CC: Keo Faith MD

## 2019-08-09 ENCOUNTER — HOSPITAL ENCOUNTER (OUTPATIENT)
Dept: CT IMAGING | Facility: HOSPITAL | Age: 79
Discharge: HOME | End: 2019-08-09
Admitting: NURSE PRACTITIONER

## 2019-08-09 DIAGNOSIS — R93.89 ABNORMAL CHEST CT: ICD-10-CM

## 2019-08-09 DIAGNOSIS — Z72.0 TOBACCO ABUSE: ICD-10-CM

## 2019-08-09 PROCEDURE — 71250 CT THORAX DX C-: CPT

## 2019-09-11 ENCOUNTER — OFFICE VISIT (OUTPATIENT)
Dept: PULMONOLOGY | Facility: CLINIC | Age: 79
End: 2019-09-11

## 2019-09-11 VITALS
HEART RATE: 97 BPM | OXYGEN SATURATION: 96 % | SYSTOLIC BLOOD PRESSURE: 138 MMHG | DIASTOLIC BLOOD PRESSURE: 72 MMHG | HEIGHT: 68 IN | WEIGHT: 139.13 LBS | RESPIRATION RATE: 16 BRPM | BODY MASS INDEX: 21.09 KG/M2

## 2019-09-11 DIAGNOSIS — R93.89 ABNORMAL CHEST CT: Primary | ICD-10-CM

## 2019-09-11 DIAGNOSIS — J44.9 COPD MIXED TYPE (HCC): ICD-10-CM

## 2019-09-11 DIAGNOSIS — Z72.0 TOBACCO ABUSE: ICD-10-CM

## 2019-09-11 PROCEDURE — 99213 OFFICE O/P EST LOW 20 MIN: CPT | Performed by: NURSE PRACTITIONER

## 2019-09-11 NOTE — PROGRESS NOTES
[facility] Pulmonary Follow up    CHIEF COMPLAINT    Abnormal CT follow up, Left Lower lobe nodule     Subjective   HISTORY OF PRESENT ILLNESS    Alexys Clancy is a 78 y.o.male here today for annual follow-up on his abnormal CT scan.  He was found to have a nodule in the left lower lobe initially seen on a CT scan of the abdomen and pelvis in November 2017, at that time it was 11 mm on follow-up CT scan of the chest in January it was 14 mm.  He did have a PET scan in February 2018 which was negative.The left lower lobe nodule had a maximum SUV of 0.90.  Therefore he had another surveillance CT scan in 6 months in August 2018 that again showed stability of the nodule.  He comes in today for his annual follow-up.    We reviewed today's scan it does show an enlarging lobulated noncalcified pulmonary nodule in the left lower lobe currently at 17 mm.    He does continue to smoke.  He has quite extensive emphysema with subpleural reticulation in bed and formation.  His PFTs in 2018 showed no obstruction with an FEV1 of 2.47, 90% predicted.  He denies any shortness of breath, cough, sputum production chest tightness or wheezing.  He will get dyspneic but it takes moderate activity.    Patient Active Problem List   Diagnosis   • Emphysema with well preserved FEV1   • Tobacco abuse   • Abnormal chest CT   • Decreased diffusion capacity probably related to emphysema       No Known Allergies    Current Outpatient Medications:   •  amoxapine (ASENDIN) 100 MG tablet, Take 100 mg by mouth 3 (Three) Times a Day., Disp: , Rfl:   •  ARIPiprazole (ABILIFY) 2 MG tablet, Take 2 mg by mouth Daily., Disp: , Rfl:   •  glipiZIDE (GLUCOTROL) 10 MG tablet, Take 10 mg by mouth Daily., Disp: , Rfl:   •  metFORMIN ER (GLUCOPHAGE-XR) 500 MG 24 hr tablet, Take 500 mg by mouth 2 (Two) Times a Day., Disp: , Rfl:   •  SITagliptin (JANUVIA) 100 MG tablet, Take 100 mg by mouth Daily., Disp: , Rfl:   MEDICATION LIST AND ALLERGIES REVIEWED.    Social History  "    Tobacco Use   • Smoking status: Current Every Day Smoker     Packs/day: 0.50     Types: Cigarettes   • Smokeless tobacco: Never Used   Substance Use Topics   • Alcohol use: No   • Drug use: No       FAMILY AND SOCIAL HISTORY REVIEWED.    Review of Systems   Constitutional: Negative for chills, fatigue, fever and unexpected weight change.   HENT: Negative for congestion, nosebleeds, postnasal drip, rhinorrhea, sinus pressure and trouble swallowing.    Respiratory: Negative for cough, chest tightness, shortness of breath and wheezing.    Cardiovascular: Negative for chest pain and leg swelling.   Gastrointestinal: Negative for abdominal pain, constipation, diarrhea, nausea and vomiting.   Genitourinary: Negative for dysuria, frequency, hematuria and urgency.   Musculoskeletal: Negative for myalgias.   Neurological: Negative for dizziness, weakness, numbness and headaches.   All other systems reviewed and are negative.  .  Objective   /72   Pulse 97   Resp 16   Ht 172.7 cm (68\")   Wt 63.1 kg (139 lb 2 oz)   SpO2 96% Comment: Resting Room Air  BMI 21.15 kg/m²      Immunization History   Administered Date(s) Administered   • Flu Vaccine High Dose PF 65YR+ 09/29/2017   • Pneumococcal Polysaccharide (PPSV23) 10/01/2014       Physical Exam   Constitutional: He is oriented to person, place, and time. He appears well-developed and well-nourished.   HENT:   Head: Normocephalic and atraumatic.   Eyes: EOM are normal. Pupils are equal, round, and reactive to light.   Neck: Normal range of motion. Neck supple.   Cardiovascular: Normal rate and regular rhythm.   No murmur heard.  Pulmonary/Chest: Effort normal. No respiratory distress. He has no wheezes. He has no rales.   Decreased, no wheezing or rales   Abdominal: Soft. Bowel sounds are normal. He exhibits no distension.   Musculoskeletal: Normal range of motion. He exhibits no edema.   Neurological: He is alert and oriented to person, place, and time.   Skin: " Skin is warm and dry. No erythema.   Psychiatric: He has a normal mood and affect. His behavior is normal.   Vitals reviewed.        RESULTS      EXAMINATION: CT CHEST WO CONTRAST - 08/09/2019     INDICATION: R93.89-Abnormal findings on diagnostic imaging of other  specified body structures; Z72.0-Tobacco use.      TECHNIQUE: CT chest without intravenous contrast administration.     The radiation dose reduction device was turned on for each scan per the  ALARA (As Low as Reasonably Achievable) protocol.     COMPARISON: CT dated 08/04/2018.     FINDINGS: Thyroid is homogeneous in attenuation. Mildly enlarged  mediastinal lymph nodes similar to prior. Central airways are patent.  Esophagus in normal course. Atherosclerotic nonaneurysmal thoracic  aorta. Cardiac size within normal limits and without pericardial  effusion demonstrating coronary calcifications. Extended lung windows  demonstrate severe centrilobular emphysema with subpleural reticulation  and band formation as well as honeycombing present of fibrotic change  along with a mildly lobulated noncalcified pulmonary nodule at the  medial segment left lower lobe enlargement from prior comparison 17 mm  versus previously 13 mm. No pleural effusion. Degenerative changes of  the spine without aggressive osseous lesion. No soft tissue body wall  findings of concern specifically no bulky axillary adenopathy. Visual is  portions of the upper abdomen unremarkable.     IMPRESSION:  Enlarging lobulated noncalcified pulmonary nodule medial  segment left lower lobe from prior comparison. Lung RADS Category 4B  with recommend PET/CT evaluation. Extensive background chronic changes.     DICTATED:   08/09/2019  EDITED/ls :   08/11/2019      This report was finalized on 8/11/2019 5:20 PM by Dr. Timbo Daugherty.              Assessment/Plan     PROBLEM LIST    Problem List Items Addressed This Visit        Respiratory    Emphysema with well preserved FEV1       Other    Tobacco  abuse    Abnormal chest CT - Primary    Relevant Orders    CT Chest Without Contrast            DISCUSSION    We discussed that the nodule has enlarged somewhat.  Due to his high risk factors we will go ahead and follow that up in 6 months.  He has had a negative PET with an SUV of 0.90 in the past.    We did discuss the possibility of a biopsy if it continues to enlarge, or a repeat PET scan.    Follow-up with Dr. Callahan on his repeat CT of the chest.    I spent 15 minutes with the patient. I spent > 50% percent of this time counseling and discussing diagnostic testing, evaluation and management.    Helen Caruso, APRN  09/11/20191:59 PM  Electronically signed     Please note that portions of this note were completed with a voice recognition program. Efforts were made to edit the dictations, but occasionally words are mistranscribed.      CC: Keo Faith MD

## 2020-02-14 ENCOUNTER — TRANSCRIBE ORDERS (OUTPATIENT)
Dept: PULMONOLOGY | Facility: CLINIC | Age: 80
End: 2020-02-14

## 2020-02-17 ENCOUNTER — HOSPITAL ENCOUNTER (OUTPATIENT)
Dept: CT IMAGING | Facility: HOSPITAL | Age: 80
Discharge: HOME | End: 2020-02-17
Admitting: NURSE PRACTITIONER

## 2020-02-17 DIAGNOSIS — R93.89 ABNORMAL CHEST CT: ICD-10-CM

## 2020-02-17 PROCEDURE — 71250 CT THORAX DX C-: CPT

## 2020-03-19 ENCOUNTER — OFFICE VISIT (OUTPATIENT)
Dept: PULMONOLOGY | Facility: CLINIC | Age: 80
End: 2020-03-19

## 2020-03-19 VITALS
SYSTOLIC BLOOD PRESSURE: 124 MMHG | HEIGHT: 68 IN | BODY MASS INDEX: 21.13 KG/M2 | WEIGHT: 139.4 LBS | OXYGEN SATURATION: 97 % | TEMPERATURE: 98.8 F | DIASTOLIC BLOOD PRESSURE: 78 MMHG | HEART RATE: 99 BPM

## 2020-03-19 DIAGNOSIS — J44.9 COPD MIXED TYPE (HCC): ICD-10-CM

## 2020-03-19 DIAGNOSIS — Z72.0 TOBACCO ABUSE: ICD-10-CM

## 2020-03-19 DIAGNOSIS — R93.89 ABNORMAL CHEST CT: Primary | ICD-10-CM

## 2020-03-19 PROCEDURE — 99214 OFFICE O/P EST MOD 30 MIN: CPT | Performed by: INTERNAL MEDICINE

## 2020-03-19 NOTE — PROGRESS NOTES
PULMONARY  NOTE    Chief Complaint     Tobacco abuse, left lower lobe nodule, COPD with preserved lung function    History of Present Illness     79-year-old white male returns today for follow-up.  He was last seen in the office by FAROOQ Hyatt on 9/11/2019    He has a pulmonary nodule in the left lower lobe that we have followed since November 2017.  This was initially identified on a CT scan of the abdomen and pelvis done in November 2017.  At that point it was characterized as an 11 mm nodule.  A follow-up CT scan of the chest done in January 2018 was felt to represent 14 mm in size.  A PET CT scan done in February 2018 revealed no abnormal hypermetabolic activity.    Subsequent serial CT scans of the chest most recently done in August 2019 and again in February 2020 have revealed a lesion of 17 mm in 2019 and 16 mm most recently.  Diffuse changes of obstructive airways disease are seen, as well but no other suspicious or abnormal lesions.    Patient has no hemoptysis.    He has a daily cough.    His wife indicates that he makes noise when he breathes but he does not really notice it.  He denies dyspnea on exertion.    He has a long history of tobacco abuse that is ongoing.    Patient Active Problem List   Diagnosis   • Emphysema with well preserved FEV1   • Tobacco abuse   • Abnormal chest CT (< 2cmm pulmonary nodule LLL)   • Decreased diffusion capacity probably related to emphysema     No Known Allergies    Current Outpatient Medications:   •  amoxapine (ASENDIN) 100 MG tablet, Take 100 mg by mouth 3 (Three) Times a Day., Disp: , Rfl:   •  glipiZIDE (GLUCOTROL) 10 MG tablet, Take 10 mg by mouth Daily., Disp: , Rfl:   •  metFORMIN ER (GLUCOPHAGE-XR) 500 MG 24 hr tablet, Take 500 mg by mouth 2 (Two) Times a Day., Disp: , Rfl:   •  SITagliptin (JANUVIA) 100 MG tablet, Take 100 mg by mouth Daily., Disp: , Rfl:   •  ciclopirox (PENLAC) 8 % solution, , Disp: , Rfl:   MEDICATION LIST AND ALLERGIES  "REVIEWED.    Family History   Problem Relation Age of Onset   • Kidney disease Mother    • Parkinsonism Father    • Parkinsonism Sister    • Stroke Maternal Grandmother    • Stroke Maternal Grandfather      Social History     Tobacco Use   • Smoking status: Current Every Day Smoker     Packs/day: 0.50     Types: Cigarettes   • Smokeless tobacco: Never Used   Substance Use Topics   • Alcohol use: No   • Drug use: No     Social History     Social History Narrative        Is a retired     Has smoked up to 2 packs of cigarettes per day but has cut back to 6 cigarettes a day    Doesn't drink alcohol     FAMILY AND SOCIAL HISTORY REVIEWED.    Review of Systems  ALSO REFER TO SCANNED ROS SHEET FROM SAME DATE.    /78 (BP Location: Left arm, Patient Position: Sitting, Cuff Size: Adult)   Pulse 99   Temp 98.8 °F (37.1 °C) (Temporal)   Ht 172.7 cm (68\")   Wt 63.2 kg (139 lb 6.4 oz)   SpO2 97% Comment: room air seated  BMI 21.20 kg/m²   Physical Exam   Constitutional: He is oriented to person, place, and time. He appears well-developed. No distress.   HENT:   Head: Normocephalic and atraumatic.   Neck: No thyromegaly present.   Cardiovascular: Normal rate, regular rhythm and normal heart sounds.   No murmur heard.  Pulmonary/Chest: Effort normal. No stridor.   A few wheezes and rhonchi that clear with cough   Abdominal: Soft. Bowel sounds are normal.   Musculoskeletal: Normal range of motion. He exhibits no edema.   Lymphadenopathy:     He has no cervical adenopathy.        Right: No supraclavicular and no epitrochlear adenopathy present.        Left: No supraclavicular and no epitrochlear adenopathy present.   Neurological: He is alert and oriented to person, place, and time.   Skin: Skin is warm and dry. He is not diaphoretic.   Psychiatric: He has a normal mood and affect. His behavior is normal.   Nursing note and vitals reviewed.      Results     CT scan of the chest from 2/17/2020 " reviewed on PACS.  Diffuse emphysematous changes with a roughly 16 mm left lower lobe noncalcified pulmonary nodule    Problem List       ICD-10-CM ICD-9-CM   1. Abnormal chest CT (< 2cmm pulmonary nodule LLL) R93.89 793.2   2. Emphysema with well preserved FEV1 J44.9 496   3. Tobacco abuse Z72.0 305.1       Discussion     We reviewed his CT scan findings.  There have been a variety of measurements associated with this lesion,  I think in part because of its irregular and lobulated shape.  However, there has not been clear-cut enlargement on the last several scans and PET scan in 2018 was negative.  I am still cautiously optimistic this represents a benign process.  We will continue to follow her closely with serial chest imaging.    We discussed the need for total smoking abstinence.    Although he has preserved lung function, we discussed PRN albuterol.  He indicates that he does not really feel short of breath on a regular basis and deferred.    I will plan to see him back in 6 months with a repeat CT scan of the chest  Jay Callahan MD  Note electronically signed    CC: Keo Faith MD

## 2020-03-20 DIAGNOSIS — R93.89 ABNORMAL CHEST CT: Primary | ICD-10-CM

## 2020-09-01 ENCOUNTER — HOSPITAL ENCOUNTER (OUTPATIENT)
Dept: CT IMAGING | Facility: HOSPITAL | Age: 80
Discharge: HOME | End: 2020-09-01
Admitting: INTERNAL MEDICINE

## 2020-09-01 ENCOUNTER — APPOINTMENT (OUTPATIENT)
Dept: CT IMAGING | Facility: HOSPITAL | Age: 80
End: 2020-09-01

## 2020-09-01 DIAGNOSIS — R93.89 ABNORMAL CHEST CT: ICD-10-CM

## 2020-09-01 PROCEDURE — 71250 CT THORAX DX C-: CPT

## 2020-10-01 ENCOUNTER — OFFICE VISIT (OUTPATIENT)
Dept: PULMONOLOGY | Facility: CLINIC | Age: 80
End: 2020-10-01

## 2020-10-01 VITALS
BODY MASS INDEX: 20.19 KG/M2 | HEIGHT: 68 IN | WEIGHT: 133.25 LBS | RESPIRATION RATE: 18 BRPM | DIASTOLIC BLOOD PRESSURE: 80 MMHG | HEART RATE: 94 BPM | TEMPERATURE: 97.3 F | SYSTOLIC BLOOD PRESSURE: 142 MMHG | OXYGEN SATURATION: 98 %

## 2020-10-01 DIAGNOSIS — R94.2 DECREASED DIFFUSION CAPACITY: ICD-10-CM

## 2020-10-01 DIAGNOSIS — Z72.0 TOBACCO ABUSE: ICD-10-CM

## 2020-10-01 DIAGNOSIS — R93.89 ABNORMAL CHEST CT: Primary | ICD-10-CM

## 2020-10-01 DIAGNOSIS — J44.9 COPD MIXED TYPE (HCC): ICD-10-CM

## 2020-10-01 PROCEDURE — 99214 OFFICE O/P EST MOD 30 MIN: CPT | Performed by: INTERNAL MEDICINE

## 2020-10-01 RX ORDER — AMOXAPINE 150 MG/1
150 TABLET ORAL NIGHTLY
COMMUNITY
Start: 2020-08-28

## 2020-10-01 NOTE — PROGRESS NOTES
PULMONARY  NOTE    Chief Complaint     Tobacco abuse, left lower lobe nodule, emphysema with well-preserved lung function    History of Present Illness     79-year-old white male returns today for follow-up  He was last seen in the office on 3/19/2020    He has a pulmonary nodule that is been followed since November 2017  This was initially identified on a CT scan of the abdomen and pelvis  At that time it was characterized as an 11 mm pulmonary nodule  Follow-up CT scan of the chest in January 2018 reported the lesion at 14 mm in size  A PET CT scan done in February 2018 revealed no abnormal hypermetabolic activity  Subsequent CT scan of the chest in August 2019 again in February 2020 revealed a lesion at 17 mm  Most recent chest imaging has shown the lesion to be at 16 mm, including the most recent scan done 9/1/2020    Patient continues to smoke does not have any plans for smoking cessation    He denies dyspnea on exertion and does not feel limited by shortness of breath    He has no cough or sputum production and has had no hemoptysis.    Patient Active Problem List   Diagnosis   • Emphysema with well preserved FEV1   • Tobacco abuse   • Abnormal chest CT (< 2cm pulmonary nodule LLL)   • Decreased diffusion capacity probably related to emphysema     No Known Allergies    Current Outpatient Medications:   •  amoxapine (ASENDIN) 100 MG tablet, Take 100 mg by mouth 3 (Three) Times a Day., Disp: , Rfl:   •  amoxapine (ASENDIN) 150 MG tablet, Take 150 mg by mouth Every Night., Disp: , Rfl:   •  ciclopirox (PENLAC) 8 % solution, , Disp: , Rfl:   •  glipiZIDE (GLUCOTROL) 10 MG tablet, Take 10 mg by mouth Daily., Disp: , Rfl:   •  metFORMIN ER (GLUCOPHAGE-XR) 500 MG 24 hr tablet, Take 500 mg by mouth 2 (Two) Times a Day., Disp: , Rfl:   •  SITagliptin (JANUVIA) 100 MG tablet, Take 100 mg by mouth Daily., Disp: , Rfl:   MEDICATION LIST AND ALLERGIES REVIEWED.    Family History   Problem Relation Age of Onset   • Kidney  "disease Mother    • Parkinsonism Father    • Parkinsonism Sister    • Stroke Maternal Grandmother    • Stroke Maternal Grandfather      Social History     Tobacco Use   • Smoking status: Current Every Day Smoker     Packs/day: 0.50     Years: 64.00     Pack years: 32.00     Types: Cigarettes   • Smokeless tobacco: Never Used   Substance Use Topics   • Alcohol use: No   • Drug use: No     Social History     Social History Narrative        Is a retired     Has smoked up to 2 packs of cigarettes per day but has cut back to 6 cigarettes a day    Doesn't drink alcohol     FAMILY AND SOCIAL HISTORY REVIEWED.    Review of Systems  ALSO REFER TO SCANNED ROS SHEET FROM SAME DATE.    /80 (BP Location: Left arm, Patient Position: Sitting, Cuff Size: Adult)   Pulse 94   Temp 97.3 °F (36.3 °C)   Resp 18   Ht 172.7 cm (68\")   Wt 60.4 kg (133 lb 4 oz)   SpO2 98% Comment: room air at rest  BMI 20.26 kg/m²   Physical Exam  Vitals signs and nursing note reviewed.   Constitutional:       General: He is not in acute distress.     Appearance: He is well-developed. He is not diaphoretic.   HENT:      Head: Normocephalic and atraumatic.   Neck:      Thyroid: No thyromegaly.   Cardiovascular:      Rate and Rhythm: Normal rate and regular rhythm.      Heart sounds: Normal heart sounds. No murmur.   Pulmonary:      Effort: Pulmonary effort is normal.      Breath sounds: Normal breath sounds. No stridor.   Abdominal:      General: Bowel sounds are normal.      Palpations: Abdomen is soft.   Musculoskeletal: Normal range of motion.      Right lower leg: No edema.      Left lower leg: No edema.   Lymphadenopathy:      Cervical: No cervical adenopathy.      Upper Body:      Right upper body: No supraclavicular or epitrochlear adenopathy.      Left upper body: No supraclavicular or epitrochlear adenopathy.   Skin:     General: Skin is warm and dry.   Neurological:      Mental Status: He is alert and oriented " to person, place, and time.   Psychiatric:         Behavior: Behavior normal.         Results     CT scan of the chest from 9/1/2020 reviewed on PACS.  No significant change in the pulmonary nodule    Problem List       ICD-10-CM ICD-9-CM   1. Abnormal chest CT (< 2cm pulmonary nodule LLL)  R93.89 793.2   2. Decreased diffusion capacity probably related to emphysema  R94.2 794.2   3. Emphysema with well preserved FEV1  J44.9 496   4. Tobacco abuse  Z72.0 305.1       Discussion     We reviewed his chest CT findings.  Most recent chest imaging reveals a 16 mm pulmonary nodule, unchanged in comparison to most recent scans  It is still concerning that this lesion has increased in size from 11 mm in 2017  The negative PET CT scan in 2018 is somewhat more comforting.  However, this lesion bears further, close follow-up  I would recommend a 6-month CT scan of the chest    He does not have any plans for smoking cessation.    He does not feel the need for any maintenance bronchodilator therapy    I will plan to see him back in 6 months for follow-up    Jay Callahan MD  Note electronically signed    CC: Keo Faith MD

## 2020-10-02 DIAGNOSIS — R93.89 ABNORMAL CHEST CT: Primary | ICD-10-CM

## 2021-02-11 ENCOUNTER — APPOINTMENT (OUTPATIENT)
Dept: VACCINE CLINIC | Facility: HOSPITAL | Age: 81
End: 2021-02-11

## 2021-02-18 ENCOUNTER — IMMUNIZATION (OUTPATIENT)
Dept: VACCINE CLINIC | Facility: HOSPITAL | Age: 81
End: 2021-02-18

## 2021-02-18 PROCEDURE — 0011A: CPT | Performed by: INTERNAL MEDICINE

## 2021-02-18 PROCEDURE — 91301 HC SARSCO02 VAC 100MCG/0.5ML IM: CPT | Performed by: INTERNAL MEDICINE

## 2021-03-08 ENCOUNTER — HOSPITAL ENCOUNTER (OUTPATIENT)
Dept: CT IMAGING | Facility: HOSPITAL | Age: 81
Discharge: HOME | End: 2021-03-08
Admitting: INTERNAL MEDICINE

## 2021-03-08 DIAGNOSIS — R93.89 ABNORMAL CHEST CT: ICD-10-CM

## 2021-03-08 PROCEDURE — 71250 CT THORAX DX C-: CPT

## 2021-03-18 ENCOUNTER — IMMUNIZATION (OUTPATIENT)
Dept: VACCINE CLINIC | Facility: HOSPITAL | Age: 81
End: 2021-03-18

## 2021-03-18 PROCEDURE — 0012A: CPT | Performed by: INTERNAL MEDICINE

## 2021-03-18 PROCEDURE — 91301 HC SARSCO02 VAC 100MCG/0.5ML IM: CPT | Performed by: INTERNAL MEDICINE

## 2021-03-26 ENCOUNTER — OFFICE VISIT (OUTPATIENT)
Dept: PULMONOLOGY | Facility: CLINIC | Age: 81
End: 2021-03-26

## 2021-03-26 VITALS
HEIGHT: 68 IN | DIASTOLIC BLOOD PRESSURE: 70 MMHG | TEMPERATURE: 97.5 F | OXYGEN SATURATION: 93 % | BODY MASS INDEX: 20.7 KG/M2 | HEART RATE: 98 BPM | SYSTOLIC BLOOD PRESSURE: 136 MMHG | WEIGHT: 136.6 LBS

## 2021-03-26 DIAGNOSIS — J44.9 COPD MIXED TYPE (HCC): ICD-10-CM

## 2021-03-26 DIAGNOSIS — Z72.0 TOBACCO ABUSE: ICD-10-CM

## 2021-03-26 DIAGNOSIS — R93.89 ABNORMAL CHEST CT: Primary | ICD-10-CM

## 2021-03-26 PROCEDURE — 99213 OFFICE O/P EST LOW 20 MIN: CPT | Performed by: NURSE PRACTITIONER

## 2021-03-26 NOTE — PROGRESS NOTES
"[facility] Pulmonary Follow up      Chief Complaint  Emphysema and Follow-up    Subjective          Alexys Clancy presents to [facility] for follow up on his CT of the chest for a let lower lobe pulmonary nodule.     He has a pulmonary nodule that is been followed since November 2017.  This was initially identified on a CT scan of the abdomen and pelvis.  At that time it was characterized as an 11 mm pulmonary nodule    Follow-up CT scan of the chest in January 2018 reported the lesion at 14 mm in size    A PET CT scan done in February 2018 revealed no abnormal hypermetabolic activity    Subsequent CT scan of the chest in August 2018 and 2019 again in February 2020 revealed a lesion at 17 mm, September 2020 again showed a stable 16mm nodule.     He is here today for his 6 month CT follow up.    He has had no issues.       Objective     Vital Signs:   /70   Pulse 98   Temp 97.5 °F (36.4 °C)   Ht 172.7 cm (68\")   Wt 62 kg (136 lb 9.6 oz)   SpO2 93% Comment: resting, room air  BMI 20.77 kg/m²       Immunization History   Administered Date(s) Administered   • COVID-19 (MODERNA) 02/18/2021, 03/18/2021   • Fluzone High Dose =>65 Years ([facility] ONLY) 09/29/2017, 10/03/2019   • Influenza Quad Vaccine (Inpatient) 09/29/2020   • Pneumococcal Conjugate 13-Valent (PCV13) 10/03/2019   • Pneumococcal Polysaccharide (PPSV23) 10/01/2014       Physical Exam  Vitals reviewed.   Constitutional:       General: He is not in acute distress.     Appearance: He is well-developed. He is not ill-appearing.   HENT:      Head: Normocephalic and atraumatic.   Eyes:      Pupils: Pupils are equal, round, and reactive to light.   Cardiovascular:      Rate and Rhythm: Normal rate and regular rhythm.      Heart sounds: No murmur heard.     Pulmonary:      Effort: Pulmonary effort is normal. No respiratory distress.      Breath sounds: Normal breath sounds. No wheezing or rales. "   Abdominal:      General: Bowel sounds are normal. There is no distension.      Palpations: Abdomen is soft.   Musculoskeletal:         General: Normal range of motion.      Cervical back: Normal range of motion and neck supple.   Skin:     General: Skin is warm and dry.      Findings: No erythema.   Neurological:      Mental Status: He is alert and oriented to person, place, and time.   Psychiatric:         Behavior: Behavior normal.          Result Review :       Data reviewed: Radiologic studies CT of the chest ([id number])                  Assessment and Plan    Problem List Items Addressed This Visit        Pulmonary and Pneumonias    Emphysema with well preserved FEV1       Symptoms and Signs    Abnormal chest CT (< 2cm pulmonary nodule LLL) - Primary       Tobacco    Tobacco abuse          At this time the nodule remains stable.   Continue with CT of the chest every 6 months, given he is high risk.        I spent 25 minutes caring for Alexys on this date of service. This time includes time spent by me in the following activities:preparing for the visit, reviewing tests, obtaining and/or reviewing a separately obtained history, performing a medically appropriate examination and/or evaluation , counseling and educating the patient/family/caregiver, ordering medications, tests, or procedures, referring and communicating with other health care professionals  and documenting information in the medical record     Follow Up     Return in about 6 months (around 9/26/2021).  Patient was given instructions and counseling regarding his condition or for health maintenance advice. Please see specific information pulled into the AVS if appropriate.     FAROOQ Arndt, ACNP  [facility] Critical Care Medicine  Electronically signed

## 2021-03-29 DIAGNOSIS — R93.89 ABNORMAL CHEST CT: Primary | ICD-10-CM

## 2021-09-08 ENCOUNTER — HOSPITAL ENCOUNTER (OUTPATIENT)
Dept: CT IMAGING | Facility: HOSPITAL | Age: 81
Discharge: HOME | End: 2021-09-08
Admitting: NURSE PRACTITIONER

## 2021-09-08 DIAGNOSIS — R93.89 ABNORMAL CHEST CT: ICD-10-CM

## 2021-09-08 PROCEDURE — 71250 CT THORAX DX C-: CPT

## 2021-09-14 ENCOUNTER — OFFICE VISIT (OUTPATIENT)
Dept: PULMONOLOGY | Facility: CLINIC | Age: 81
End: 2021-09-14

## 2021-09-14 VITALS
TEMPERATURE: 97.5 F | HEIGHT: 68 IN | HEART RATE: 110 BPM | OXYGEN SATURATION: 94 % | DIASTOLIC BLOOD PRESSURE: 80 MMHG | SYSTOLIC BLOOD PRESSURE: 122 MMHG | WEIGHT: 133 LBS | BODY MASS INDEX: 20.16 KG/M2

## 2021-09-14 DIAGNOSIS — J44.9 COPD MIXED TYPE (HCC): ICD-10-CM

## 2021-09-14 DIAGNOSIS — R93.89 ABNORMAL CHEST CT: Primary | ICD-10-CM

## 2021-09-14 DIAGNOSIS — R94.2 DECREASED DIFFUSION CAPACITY: ICD-10-CM

## 2021-09-14 DIAGNOSIS — Z72.0 TOBACCO ABUSE: ICD-10-CM

## 2021-09-14 PROCEDURE — 99214 OFFICE O/P EST MOD 30 MIN: CPT | Performed by: INTERNAL MEDICINE

## 2021-09-14 RX ORDER — TIOTROPIUM BROMIDE AND OLODATEROL 3.124; 2.736 UG/1; UG/1
2 SPRAY, METERED RESPIRATORY (INHALATION)
Qty: 3 EACH | Refills: 3 | Status: SHIPPED | OUTPATIENT
Start: 2021-09-14

## 2021-09-14 NOTE — PROGRESS NOTES
PULMONARY  NOTE    Chief Complaint     Tobacco abuse, emphysema with well-preserved FEV1, pulmonary nodule    History of Present Illness     80-year-old male returns today for follow-up  He was last seen in the office by FAROOQ Arndt on 3/26/2021    He has a pulmonary nodule that is been followed since November 2017.  Prior PET CT scan revealed no abnormal hypermetabolic activity.  There has been very slow radiographic change in this lesion    He comes in today indicating that he continues to smoke.  At this point does not have plans for smoking cessation    He has diffuse emphysematous changes on CT scan but well-preserved FEV1 on prior spirometry  He does feel that he has slow but progressive worsening in shortness of breath on exertion.  Intermittent cough but no hemoptysis.    Patient Active Problem List   Diagnosis   • Emphysema with well preserved FEV1   • Tobacco abuse   • Abnormal chest CT (< 2cm pulmonary nodule LLL)   • Decreased diffusion capacity probably related to emphysema     No Known Allergies    Current Outpatient Medications:   •  amoxapine (ASENDIN) 100 MG tablet, Take 100 mg by mouth 3 (Three) Times a Day., Disp: , Rfl:   •  amoxapine (ASENDIN) 150 MG tablet, Take 150 mg by mouth Every Night., Disp: , Rfl:   •  ciclopirox (PENLAC) 8 % solution, , Disp: , Rfl:   •  glipiZIDE (GLUCOTROL) 10 MG tablet, Take 10 mg by mouth Daily., Disp: , Rfl:   •  metFORMIN ER (GLUCOPHAGE-XR) 500 MG 24 hr tablet, Take 500 mg by mouth 2 (Two) Times a Day., Disp: , Rfl:   •  SITagliptin (JANUVIA) 100 MG tablet, Take 100 mg by mouth Daily., Disp: , Rfl:   MEDICATION LIST AND ALLERGIES REVIEWED.    Family History   Problem Relation Age of Onset   • Kidney disease Mother    • Parkinsonism Father    • Parkinsonism Sister    • Stroke Maternal Grandmother    • Stroke Maternal Grandfather      Social History     Tobacco Use   • Smoking status: Current Every Day Smoker     Packs/day: 0.50     Years: 64.00     Pack  "years: 32.00     Types: Cigarettes   • Smokeless tobacco: Never Used   Substance Use Topics   • Alcohol use: No   • Drug use: No     Social History     Social History Narrative        Is a retired     Has smoked up to 2 packs of cigarettes per day but has cut back to 6 cigarettes a day    Doesn't drink alcohol     FAMILY AND SOCIAL HISTORY REVIEWED.    Review of Systems  ALSO REFER TO SCANNED ROS SHEET FROM SAME DATE.    /80   Pulse 110   Temp 97.5 °F (36.4 °C)   Ht 172.7 cm (68\")   Wt 60.3 kg (133 lb)   SpO2 94% Comment: resting, room air  BMI 20.22 kg/m²   Physical Exam  Vitals and nursing note reviewed.   Constitutional:       General: He is not in acute distress.     Appearance: He is well-developed. He is not diaphoretic.   HENT:      Head: Normocephalic and atraumatic.   Neck:      Thyroid: No thyromegaly.   Cardiovascular:      Rate and Rhythm: Normal rate and regular rhythm.      Heart sounds: Normal heart sounds. No murmur heard.     Pulmonary:      Effort: Pulmonary effort is normal.      Breath sounds: No stridor.      Comments: Decreased breath sounds without wheezes  Abdominal:      General: Bowel sounds are normal.      Palpations: Abdomen is soft.   Musculoskeletal:         General: Normal range of motion.   Lymphadenopathy:      Cervical: No cervical adenopathy.      Upper Body:      Right upper body: No supraclavicular or epitrochlear adenopathy.      Left upper body: No supraclavicular or epitrochlear adenopathy.   Skin:     General: Skin is warm and dry.   Neurological:      Mental Status: He is alert and oriented to person, place, and time.   Psychiatric:         Behavior: Behavior normal.         Results     CT scan of the chest from 9/8/2021 revealed \"stability\" in the pulmonary nodule, now measured at 18 mm in size.    Immunization History   Administered Date(s) Administered   • COVID-19 (MODERNA) 02/18/2021, 03/18/2021   • Fluzone High Dose =>65 Years " ([facility] ONLY) 09/29/2017, 10/03/2019   • Influenza Quad Vaccine (Inpatient) 09/29/2020   • Pneumococcal Conjugate 13-Valent (PCV13) 10/03/2019   • Pneumococcal Polysaccharide (PPSV23) 10/01/2014     Problem List       ICD-10-CM ICD-9-CM   1. Abnormal chest CT (< 2cm pulmonary nodule LLL)  R93.89 793.2   2. Decreased diffusion capacity probably related to emphysema  R94.2 794.2   3. Emphysema with well preserved FEV1  J44.9 496   4. Tobacco abuse  Z72.0 305.1       Discussion     We reviewed his chest CT scan.  His most recent CT scan is characterized as being stable.  It is being measured at 18 mm in size.  However, going back over the last 4 years, this lesion has increased from 11 to 18 mm and very well may represent a low-grade malignancy, such as adenocarcinoma.  Prior PET CT scan was unremarkable.  We discussed this in detail today.  Our plan is to get a follow-up CT scan of the chest and if there has been interval enlargement and will get an additional PET scan.  CT FNA might be considered however will be technically difficult to perform given the size and location of the lesion.  The patient is not interested in surgical resection    He has worsening dyspnea.  I suspect that this is were due to progressive COPD related to his ongoing tobacco abuse.  He is interested in maintenance therapy and will try Stiolto 2 puffs once a day  I gave him written instructions, a demonstration, and samples from the office  I will submit a prescription    We discussed the need for smoking cessation    I plan to see him back in 6 months or earlier if there are any problems in the meantime    Moderate level of Medical Decision Making complexity based on 2 or more chronic stable illnesses and prescription drug management.    Jay Callahan MD  Note electronically signed    CC: Keo Faith MD

## 2021-09-15 DIAGNOSIS — R91.1 PULMONARY NODULE: Primary | ICD-10-CM

## 2022-03-15 DIAGNOSIS — Z01.812 BLOOD TESTS PRIOR TO TREATMENT OR PROCEDURE: Primary | ICD-10-CM

## 2022-03-16 ENCOUNTER — CLINICAL SUPPORT NO REQUIREMENTS (OUTPATIENT)
Dept: PULMONOLOGY | Facility: CLINIC | Age: 82
End: 2022-03-16

## 2022-03-16 DIAGNOSIS — Z01.812 BLOOD TESTS PRIOR TO TREATMENT OR PROCEDURE: ICD-10-CM

## 2022-03-16 PROCEDURE — U0004 COV-19 TEST NON-CDC HGH THRU: HCPCS | Performed by: INTERNAL MEDICINE

## 2022-03-16 PROCEDURE — 99211 OFF/OP EST MAY X REQ PHY/QHP: CPT | Performed by: INTERNAL MEDICINE

## 2022-03-17 ENCOUNTER — HOSPITAL ENCOUNTER (OUTPATIENT)
Dept: CT IMAGING | Facility: HOSPITAL | Age: 82
Discharge: HOME | End: 2022-03-17
Admitting: NURSE PRACTITIONER

## 2022-03-17 DIAGNOSIS — R91.1 PULMONARY NODULE: ICD-10-CM

## 2022-03-17 LAB — SARS-COV-2 RNA NOSE QL NAA+PROBE: NOT DETECTED

## 2022-03-17 PROCEDURE — 71250 CT THORAX DX C-: CPT

## 2022-03-18 ENCOUNTER — OFFICE VISIT (OUTPATIENT)
Dept: PULMONOLOGY | Facility: CLINIC | Age: 82
End: 2022-03-18

## 2022-03-18 VITALS
DIASTOLIC BLOOD PRESSURE: 78 MMHG | TEMPERATURE: 97.3 F | OXYGEN SATURATION: 98 % | SYSTOLIC BLOOD PRESSURE: 136 MMHG | RESPIRATION RATE: 16 BRPM | WEIGHT: 136 LBS | HEART RATE: 89 BPM | BODY MASS INDEX: 20.61 KG/M2 | HEIGHT: 68 IN

## 2022-03-18 DIAGNOSIS — Z72.0 TOBACCO ABUSE: ICD-10-CM

## 2022-03-18 DIAGNOSIS — R93.89 ABNORMAL CHEST CT: ICD-10-CM

## 2022-03-18 DIAGNOSIS — R91.1 PULMONARY NODULE: Primary | ICD-10-CM

## 2022-03-18 DIAGNOSIS — R94.2 DECREASED DIFFUSION CAPACITY: ICD-10-CM

## 2022-03-18 DIAGNOSIS — J44.9 COPD MIXED TYPE: ICD-10-CM

## 2022-03-18 PROCEDURE — 94726 PLETHYSMOGRAPHY LUNG VOLUMES: CPT | Performed by: INTERNAL MEDICINE

## 2022-03-18 PROCEDURE — 94729 DIFFUSING CAPACITY: CPT | Performed by: INTERNAL MEDICINE

## 2022-03-18 PROCEDURE — 94010 BREATHING CAPACITY TEST: CPT | Performed by: INTERNAL MEDICINE

## 2022-03-18 PROCEDURE — 99214 OFFICE O/P EST MOD 30 MIN: CPT | Performed by: INTERNAL MEDICINE

## 2022-03-18 RX ORDER — LEVOCETIRIZINE DIHYDROCHLORIDE 5 MG/1
TABLET, FILM COATED ORAL
COMMUNITY
Start: 2022-03-18

## 2022-03-18 RX ORDER — CYANOCOBALAMIN 1000 UG/ML
1 INJECTION, SOLUTION INTRAMUSCULAR; SUBCUTANEOUS
COMMUNITY

## 2022-03-18 RX ORDER — DUTASTERIDE 0.5 MG/1
CAPSULE, LIQUID FILLED ORAL
COMMUNITY
Start: 2022-02-04

## 2022-03-18 NOTE — PROGRESS NOTES
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    He has emphysema, currently stage I although has significant diffusion impairment  He feels that he is slowly but progressively gotten worse regarding shortness of breath    He continues to smoke and has no plans for smoking cessation    Patient Active Problem List   Diagnosis   • Stage I emphysema   • Tobacco abuse   • Abnormal chest CT (< 2cm pulmonary nodule LLL)   • Decreased diffusion capacity probably related to emphysema     No Known Allergies    Current Outpatient Medications:   •  amoxapine (ASENDIN) 100 MG tablet, Take 100 mg by mouth 3 (Three) Times a Day., Disp: , Rfl:   •  amoxapine (ASENDIN) 150 MG tablet, Take 150 mg by mouth Every Night., Disp: , Rfl:   •  ciclopirox (PENLAC) 8 % solution, , Disp: , Rfl:   •  cyanocobalamin 1000 MCG/ML injection, 1 mL., Disp: , Rfl:   •  dutasteride (AVODART) 0.5 MG capsule, , Disp: , Rfl:   •  glipiZIDE (GLUCOTROL) 10 MG tablet, Take 10 mg by mouth Daily., Disp: , Rfl:   •  levocetirizine (XYZAL) 5 MG tablet, , Disp: , Rfl:   •  metFORMIN ER (GLUCOPHAGE-XR) 500 MG 24 hr tablet, Take 500 mg by mouth 2 (Two) Times a Day., Disp: , Rfl:   •  SITagliptin (JANUVIA) 100 MG tablet, Take 100 mg by mouth Daily., Disp: , Rfl:   •  tiotropium bromide-olodaterol (Stiolto Respimat) 2.5-2.5 MCG/ACT aerosol solution inhaler, Inhale 2 puffs  "Daily., Disp: 3 each, Rfl: 3  MEDICATION LIST AND ALLERGIES REVIEWED.    Family History   Problem Relation Age of Onset   • Kidney disease Mother    • Parkinsonism Father    • Parkinsonism Sister    • Stroke Maternal Grandmother    • Stroke Maternal Grandfather      Social History     Tobacco Use   • Smoking status: Current Every Day Smoker     Packs/day: 0.50     Years: 64.00     Pack years: 32.00     Types: Cigarettes   • Smokeless tobacco: Never Used   Substance Use Topics   • Alcohol use: No   • Drug use: No     Social History     Social History Narrative        Is a retired     Has smoked up to 2 packs of cigarettes per day but has cut back to 6 cigarettes a day    Doesn't drink alcohol     FAMILY AND SOCIAL HISTORY REVIEWED.    Review of Systems  ALSO REFER TO SCANNED ROS SHEET FROM SAME DATE.    /78 (BP Location: Right arm, Patient Position: Sitting, Cuff Size: Adult)   Pulse 89   Temp 97.3 °F (36.3 °C) (Infrared)   Resp 16   Ht 172.7 cm (68\")   Wt 61.7 kg (136 lb)   SpO2 98%   BMI 20.68 kg/m²   Physical Exam  Vitals and nursing note reviewed.   Constitutional:       General: He is not in acute distress.     Appearance: He is well-developed. He is not diaphoretic.   HENT:      Head: Normocephalic and atraumatic.   Neck:      Thyroid: No thyromegaly.   Cardiovascular:      Rate and Rhythm: Normal rate and regular rhythm.      Heart sounds: Normal heart sounds. No murmur heard.  Pulmonary:      Effort: Pulmonary effort is normal.      Breath sounds: Normal breath sounds. No stridor.   Chest:   Breasts:      Right: No supraclavicular adenopathy.      Left: No supraclavicular adenopathy.       Lymphadenopathy:      Cervical: No cervical adenopathy.      Upper Body:      Right upper body: No supraclavicular or epitrochlear adenopathy.      Left upper body: No supraclavicular or epitrochlear adenopathy.   Skin:     General: Skin is warm and dry.   Neurological:      Mental " Status: He is alert and oriented to person, place, and time.   Psychiatric:         Behavior: Behavior normal.         Results     CT scan of the chest from 3/17/2022 reviewed on PACS  Stable 18 mm pulmonary nodule in the left lower lobe  Stable renal artery aneurysm 1.4 cm    Immunization History   Administered Date(s) Administered   • COVID-19 (MODERNA) 1st, 2nd, 3rd Dose Only 02/18/2021, 03/18/2021, 11/03/2021   • Fluzone High Dose =>65 Years ([facility] ONLY) 09/29/2017, 10/03/2019   • Fluzone High-Dose 65+yrs 09/24/2021   • Influenza Quad Vaccine (Inpatient) 09/29/2020   • Pneumococcal Conjugate 13-Valent (PCV13) 10/03/2019   • Pneumococcal Polysaccharide (PPSV23) 10/01/2014     Problem List       ICD-10-CM ICD-9-CM   1. Pulmonary nodule  R91.1 793.11   2. Abnormal chest CT (< 2cm pulmonary nodule LLL)  R93.89 793.2   3. Decreased diffusion capacity probably related to emphysema  R94.2 794.2   4. Stage I emphysema  J44.9 496   5. Tobacco abuse  Z72.0 305.1       Discussion     We reviewed his chest imaging  There are no new or progressive findings  The left lower lobe lesion appears stable now over the last 3 scans at about 18 mm  I recommended continued, serial chest imaging  We will get a repeat CT scan of the chest in 6 months    I have encouraged smoking cessation    We discussed his PFTs  He has mild airway obstruction due to his history of tobacco abuse    Would recommend albuterol on an as-needed basis    Jay Callahan MD  Note electronically signed    CC: Keo Faith MD

## 2022-03-21 DIAGNOSIS — R93.89 ABNORMAL CHEST CT: Primary | ICD-10-CM

## 2022-09-06 ENCOUNTER — HOSPITAL ENCOUNTER (OUTPATIENT)
Dept: CT IMAGING | Facility: HOSPITAL | Age: 82
Discharge: HOME | End: 2022-09-06
Admitting: NURSE PRACTITIONER

## 2022-09-06 DIAGNOSIS — R93.89 ABNORMAL CHEST CT: ICD-10-CM

## 2022-09-06 PROCEDURE — 71250 CT THORAX DX C-: CPT

## 2022-10-27 ENCOUNTER — OFFICE VISIT (OUTPATIENT)
Dept: PULMONOLOGY | Facility: CLINIC | Age: 82
End: 2022-10-27

## 2022-10-27 VITALS
TEMPERATURE: 97.7 F | SYSTOLIC BLOOD PRESSURE: 126 MMHG | DIASTOLIC BLOOD PRESSURE: 80 MMHG | RESPIRATION RATE: 16 BRPM | BODY MASS INDEX: 19.7 KG/M2 | OXYGEN SATURATION: 99 % | HEART RATE: 95 BPM | HEIGHT: 68 IN | WEIGHT: 130 LBS

## 2022-10-27 DIAGNOSIS — Z72.0 TOBACCO ABUSE: ICD-10-CM

## 2022-10-27 DIAGNOSIS — R93.89 ABNORMAL CHEST CT: Primary | ICD-10-CM

## 2022-10-27 DIAGNOSIS — J44.9 COPD MIXED TYPE: ICD-10-CM

## 2022-10-27 DIAGNOSIS — R94.2 DECREASED DIFFUSION CAPACITY: ICD-10-CM

## 2022-10-27 PROCEDURE — 99214 OFFICE O/P EST MOD 30 MIN: CPT | Performed by: INTERNAL MEDICINE

## 2022-10-27 RX ORDER — GLIPIZIDE 10 MG/1
TABLET ORAL
COMMUNITY

## 2022-10-27 RX ORDER — METFORMIN HYDROCHLORIDE 500 MG/1
TABLET, FILM COATED, EXTENDED RELEASE ORAL EVERY 12 HOURS SCHEDULED
COMMUNITY

## 2022-10-27 RX ORDER — AMOXAPINE 150 MG/1
TABLET ORAL
COMMUNITY

## 2022-10-27 NOTE — PROGRESS NOTES
PULMONARY  NOTE    Chief Complaint     Abnormal CT scan of the chest, pulmonary nodule, tobacco abuse, emphysema, renal artery aneurysm    History of Present Illness     81-year-old male returns today for follow-up  I last saw him 3/18/2022    He has a pulmonary nodule that is been followed since November 2017  It has shown slow interval increase over about a 4-year period of time from 11 mm to 18 mm  Initial PET scan revealed no abnormal hypermetabolic activity  Last several CT scans have been stable at about 18 mm    He has a history of COPD with stage I airway obstruction  He has dyspnea on exertion  Currently just has albuterol to use as needed  He does not really feel that any of the inhalers help a whole lot    He has ongoing tobacco abuse with no plans for smoking cessation    Patient Active Problem List   Diagnosis   • Stage I emphysema   • Tobacco abuse   • Abnormal chest CT (< 2cm pulmonary nodule LLL)   • Decreased diffusion capacity probably related to emphysema     No Known Allergies    Current Outpatient Medications:   •  amoxapine (ASENDIN) 100 MG tablet, Take 100 mg by mouth 3 (Three) Times a Day., Disp: , Rfl:   •  ciclopirox (PENLAC) 8 % solution, , Disp: , Rfl:   •  dutasteride (AVODART) 0.5 MG capsule, , Disp: , Rfl:   •  glipizide (GLUCOTROL) 10 MG tablet, glipizide 10 mg tablet  Take 1 tablet every day by oral route., Disp: , Rfl:   •  halobetasol (ULTRAVATE) 0.05 % cream, halobetasol propionate 0.05 % topical cream  APPLY A THIN LAYER TO THE AFFECTED AREA(S) BY TOPICAL ROUTE ONCE DAILY DO NOT EXCEED 50 GRAMS PER WEEK OR 2 WEEKS DURATION, Disp: , Rfl:   •  metFORMIN (GLUMETZA) 500 MG (MOD) 24 hr tablet, Every 12 (Twelve) Hours., Disp: , Rfl:   •  SITagliptin (JANUVIA) 100 MG tablet, Take 100 mg by mouth Daily., Disp: , Rfl:   •  amoxapine (ASENDIN) 150 MG tablet, Take 150 mg by mouth Every Night., Disp: , Rfl:   •  amoxapine (ASENDIN) 150 MG tablet, amoxapine 150 mg tablet  Take 1 tablet  "every day by oral route., Disp: , Rfl:   •  cyanocobalamin 1000 MCG/ML injection, 1 mL., Disp: , Rfl:   •  glipiZIDE (GLUCOTROL) 10 MG tablet, Take 10 mg by mouth Daily., Disp: , Rfl:   •  levocetirizine (XYZAL) 5 MG tablet, , Disp: , Rfl:   •  metFORMIN ER (GLUCOPHAGE-XR) 500 MG 24 hr tablet, Take 500 mg by mouth 2 (Two) Times a Day., Disp: , Rfl:   •  tiotropium bromide-olodaterol (Stiolto Respimat) 2.5-2.5 MCG/ACT aerosol solution inhaler, Inhale 2 puffs Daily., Disp: 3 each, Rfl: 3  MEDICATION LIST AND ALLERGIES REVIEWED.    Family History   Problem Relation Age of Onset   • Kidney disease Mother    • Parkinsonism Father    • Parkinsonism Sister    • Stroke Maternal Grandmother    • Stroke Maternal Grandfather      Social History     Tobacco Use   • Smoking status: Every Day     Packs/day: 0.50     Years: 64.00     Pack years: 32.00     Types: Cigarettes     Start date: [birth year]/1955   • Smokeless tobacco: Never   Substance Use Topics   • Alcohol use: No   • Drug use: No     Social History     Social History Narrative        Is a retired     Has smoked up to 2 packs of cigarettes per day but has cut back to 6 cigarettes a day    Doesn't drink alcohol     FAMILY AND SOCIAL HISTORY REVIEWED.    Review of Systems  ALSO REFER TO SCANNED ROS SHEET FROM SAME DATE.    /80 (BP Location: Left arm, Patient Position: Sitting, Cuff Size: Adult)   Pulse 95   Temp 97.7 °F (36.5 °C) (Infrared)   Resp 16   Ht 172.7 cm (68\")   Wt 59 kg (130 lb)   SpO2 99%   BMI 19.77 kg/m²   Physical Exam  Vitals and nursing note reviewed.   Constitutional:       General: He is not in acute distress.     Appearance: He is well-developed. He is not diaphoretic.   HENT:      Head: Normocephalic and atraumatic.   Neck:      Thyroid: No thyromegaly.   Cardiovascular:      Rate and Rhythm: Normal rate and regular rhythm.      Heart sounds: Normal heart sounds. No murmur heard.  Pulmonary:      Effort: Pulmonary " effort is normal.      Breath sounds: Normal breath sounds. No stridor.   Lymphadenopathy:      Cervical: No cervical adenopathy.      Upper Body:      Right upper body: No supraclavicular or epitrochlear adenopathy.      Left upper body: No supraclavicular or epitrochlear adenopathy.   Skin:     General: Skin is warm and dry.   Neurological:      Mental Status: He is alert and oriented to person, place, and time.   Psychiatric:         Behavior: Behavior normal.         Results     CT scan of the chest from 9/6/2022 reviewed on PACS  Stability in previously noted parenchymal density    Immunization History   Administered Date(s) Administered   • COVID-19 (MODERNA) 1st, 2nd, 3rd Dose Only 02/18/2021, 03/18/2021, 11/03/2021   • Fluzone High Dose =>65 Years ([facility] ONLY) 09/29/2017, 10/03/2019   • Fluzone High-Dose 65+yrs 09/24/2021   • Influenza Quad Vaccine (Inpatient) 09/29/2020   • Pneumococcal Conjugate 13-Valent (PCV13) 10/03/2019   • Pneumococcal Polysaccharide (PPSV23) 10/01/2014     Problem List       ICD-10-CM ICD-9-CM   1. Abnormal chest CT (< 2cm pulmonary nodule LLL)  R93.89 793.2   2. Decreased diffusion capacity probably related to emphysema  R94.2 794.2   3. Stage I emphysema  J44.9 496   4. Tobacco abuse  Z72.0 305.1       Discussion     He is stable from a pulmonary standpoint  Unfortunately continues to smoke with no plans for smoking cessation    He really has not found any benefit of any of the inhalers in the past but will continue to have albuterol to use on an as-needed basis    I recommended a repeat CT scan of the chest in September 2023    I will see him back at that time or earlier if there are any problems in the meantime    Moderate level of Medical Decision Making complexity based on 2 or more chronic stable illnesses and prescription drug management.    Jay Callahan MD  Note electronically signed    CC: Keo Faith MD

## 2022-10-28 DIAGNOSIS — R93.89 ABNORMAL CHEST CT: Primary | ICD-10-CM

## 2022-10-28 DIAGNOSIS — R91.1 PULMONARY NODULE: ICD-10-CM

## 2023-09-05 ENCOUNTER — HOSPITAL ENCOUNTER (OUTPATIENT)
Dept: CT IMAGING | Facility: HOSPITAL | Age: 83
Discharge: HOME | End: 2023-09-05
Admitting: INTERNAL MEDICINE
Payer: MEDICARE

## 2023-09-05 DIAGNOSIS — R91.1 PULMONARY NODULE: ICD-10-CM

## 2023-09-05 DIAGNOSIS — R93.89 ABNORMAL CHEST CT: ICD-10-CM

## 2023-09-05 PROCEDURE — 71250 CT THORAX DX C-: CPT

## 2023-11-01 ENCOUNTER — OFFICE VISIT (OUTPATIENT)
Dept: PULMONOLOGY | Facility: CLINIC | Age: 83
End: 2023-11-01
Payer: MEDICARE

## 2023-11-01 VITALS
SYSTOLIC BLOOD PRESSURE: 134 MMHG | TEMPERATURE: 97.5 F | BODY MASS INDEX: 18.32 KG/M2 | HEART RATE: 98 BPM | DIASTOLIC BLOOD PRESSURE: 70 MMHG | WEIGHT: 128 LBS | OXYGEN SATURATION: 97 % | HEIGHT: 70 IN

## 2023-11-01 DIAGNOSIS — R93.89 ABNORMAL CHEST CT: Primary | ICD-10-CM

## 2023-11-01 DIAGNOSIS — R94.2 DECREASED DIFFUSION CAPACITY: ICD-10-CM

## 2023-11-01 DIAGNOSIS — Z72.0 TOBACCO ABUSE: ICD-10-CM

## 2023-11-01 DIAGNOSIS — I72.2 RENAL ARTERY ANEURYSM: ICD-10-CM

## 2023-11-01 DIAGNOSIS — J44.9 COPD MIXED TYPE: ICD-10-CM

## 2023-11-01 RX ORDER — FOLIC ACID 1 MG/1
TABLET ORAL
COMMUNITY
Start: 2023-05-11

## 2023-11-01 NOTE — PROGRESS NOTES
PULMONARY  NOTE    Chief Complaint     Abnormal CT scan of the chest, pulmonary nodule, tobacco abuse, emphysema, renal artery aneurysm    History of Present Illness     82-year-old male returns today for follow-up  I last saw him about a year ago    He has ongoing tobacco abuse with no plans for smoking cessation    He had a pulmonary nodule that I have been following since November 2017  It seemed to show a slow increase in size over 4-year period and went from 11 mm to 18 mm  Initial PET scan revealed no abnormal hypermetabolic activity  Last several years the nodule has remained stable as noted below    He has stage I chronic obstructive pulmonary disease  He has had albuterol in the past but really has not felt any benefit from any inhalers    No exacerbation of symptoms since I last saw him    Patient Active Problem List   Diagnosis    Stage I emphysema    Tobacco abuse    Abnormal chest CT (< 2cm pulmonary nodule LLL)    Decreased diffusion capacity probably related to emphysema      No Known Allergies    Current Outpatient Medications:     amoxapine (ASENDIN) 100 MG tablet, Take 1 tablet by mouth 3 (Three) Times a Day., Disp: , Rfl:     ciclopirox (PENLAC) 8 % solution, , Disp: , Rfl:     dutasteride (AVODART) 0.5 MG capsule, , Disp: , Rfl:     folic acid (FOLVITE) 1 MG tablet, Take 1 tablet every day by oral route., Disp: , Rfl:     glipiZIDE (GLUCOTROL) 10 MG tablet, Take 1 tablet by mouth Daily., Disp: , Rfl:     halobetasol (ULTRAVATE) 0.05 % cream, halobetasol propionate 0.05 % topical cream  APPLY A THIN LAYER TO THE AFFECTED AREA(S) BY TOPICAL ROUTE ONCE DAILY DO NOT EXCEED 50 GRAMS PER WEEK OR 2 WEEKS DURATION, Disp: , Rfl:     metFORMIN ER (GLUCOPHAGE-XR) 500 MG 24 hr tablet, Take 1 tablet by mouth 2 (Two) Times a Day., Disp: , Rfl:     SITagliptin (JANUVIA) 100 MG tablet, Take 1 tablet by mouth Daily., Disp: , Rfl:   MEDICATION LIST AND ALLERGIES REVIEWED.    Family History   Problem Relation Age  "of Onset    Kidney disease Mother     Parkinsonism Father     Parkinsonism Sister     Stroke Maternal Grandmother     Stroke Maternal Grandfather      Social History     Tobacco Use    Smoking status: Every Day     Packs/day: 0.50     Years: 64.00     Additional pack years: 0.00     Total pack years: 32.00     Types: Cigarettes     Start date: [birth year]/15/1955    Smokeless tobacco: Never   Substance Use Topics    Alcohol use: No    Drug use: No     Social History     Social History Narrative        Is a retired     Has smoked up to 2 packs of cigarettes per day but has cut back to 6 cigarettes a day    Doesn't drink alcohol     FAMILY AND SOCIAL HISTORY REVIEWED.    Review of Systems  IF PRESENT REFER TO SCANNED ROS SHEET FROM SAME DATE  OTHERWISE ROS OBTAINED AND NON-CONTRIBUTORY OVER HPI.    /70 (BP Location: Left arm, Patient Position: Sitting, Cuff Size: Adult)   Pulse 98   Temp 97.5 °F (36.4 °C)   Ht 177.8 cm (70\")   Wt 58.1 kg (128 lb)   SpO2 97% Comment: Room air at rest  BMI 18.37 kg/m²   Physical Exam  Vitals and nursing note reviewed.   Constitutional:       General: He is not in acute distress.     Appearance: He is well-developed. He is not diaphoretic.   HENT:      Head: Normocephalic and atraumatic.   Neck:      Thyroid: No thyromegaly.   Cardiovascular:      Rate and Rhythm: Normal rate and regular rhythm.      Heart sounds: Normal heart sounds. No murmur heard.  Pulmonary:      Effort: Pulmonary effort is normal.      Breath sounds: No stridor.      Comments: A few wheezes  Lymphadenopathy:      Cervical: No cervical adenopathy.      Upper Body:      Right upper body: No supraclavicular or epitrochlear adenopathy.      Left upper body: No supraclavicular or epitrochlear adenopathy.   Skin:     General: Skin is warm and dry.   Neurological:      Mental Status: He is alert and oriented to person, place, and time.   Psychiatric:         Behavior: Behavior normal. "         Results     CT scan of the chest from 9/5/2023 revealed stability in the previously noted pulmonary nodule with no new parenchymal findings    Immunization History   Administered Date(s) Administered    COVID-19 (MODERNA) 1st,2nd,3rd Dose Monovalent 02/18/2021, 03/18/2021, 11/03/2021    COVID-19 (MODERNA) BIVALENT 12+YRS 02/06/2023    Fluzone High Dose =>65 Years ([facility] ONLY) 09/29/2017, 10/03/2019    Fluzone High-Dose 65+yrs 09/24/2021, 08/31/2023    Influenza Quad Vaccine (Inpatient) 09/29/2020    Pneumococcal Conjugate 13-Valent (PCV13) 10/03/2019    Pneumococcal Polysaccharide (PPSV23) 10/01/2014     Problem List       ICD-10-CM ICD-9-CM   1. Abnormal chest CT (< 2cm pulmonary nodule LLL)  R93.89 793.2   2. Decreased diffusion capacity probably related to emphysema  R94.2 794.2   3. Stage I emphysema  J44.9 496   4. 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    Jay Callahan MD  Note electronically signed    CC: Keo Faith MD

## 2023-11-02 DIAGNOSIS — R91.1 PULMONARY NODULE: ICD-10-CM

## 2023-11-02 DIAGNOSIS — R93.89 ABNORMAL CHEST CT: Primary | ICD-10-CM

## 2024-10-21 ENCOUNTER — HOSPITAL ENCOUNTER (OUTPATIENT)
Dept: CT IMAGING | Facility: HOSPITAL | Age: 84
Discharge: HOME | End: 2024-10-21
Admitting: INTERNAL MEDICINE
Payer: MEDICARE

## 2024-10-21 DIAGNOSIS — R93.89 ABNORMAL CHEST CT: ICD-10-CM

## 2024-10-21 DIAGNOSIS — R91.1 PULMONARY NODULE: ICD-10-CM

## 2024-10-21 PROCEDURE — 71250 CT THORAX DX C-: CPT

## 2024-10-22 ENCOUNTER — DOCUMENTATION (OUTPATIENT)
Dept: PULMONOLOGY | Facility: CLINIC | Age: 84
End: 2024-10-22
Payer: MEDICARE

## 2024-10-22 NOTE — PROGRESS NOTES
The patient underwent a CT scan of the chest which I have reviewed on PACS.  Stability in the previously noted pulmonary nodule.    I communicated these results to the patient on the phone and encouraged him to keep his follow-up appointment so we can decide when the next time is that he needs to scan, probably in 1 year

## 2024-12-18 ENCOUNTER — OFFICE VISIT (OUTPATIENT)
Dept: PULMONOLOGY | Facility: CLINIC | Age: 84
End: 2024-12-18
Payer: MEDICARE

## 2024-12-18 VITALS
WEIGHT: 130 LBS | HEART RATE: 95 BPM | BODY MASS INDEX: 18.61 KG/M2 | TEMPERATURE: 96.9 F | OXYGEN SATURATION: 99 % | DIASTOLIC BLOOD PRESSURE: 84 MMHG | SYSTOLIC BLOOD PRESSURE: 136 MMHG | HEIGHT: 70 IN

## 2024-12-18 DIAGNOSIS — Z72.0 TOBACCO ABUSE: ICD-10-CM

## 2024-12-18 DIAGNOSIS — R94.2 DECREASED DIFFUSION CAPACITY: ICD-10-CM

## 2024-12-18 DIAGNOSIS — R93.89 ABNORMAL CHEST CT: Primary | ICD-10-CM

## 2024-12-18 DIAGNOSIS — J44.9 COPD MIXED TYPE: ICD-10-CM

## 2024-12-18 NOTE — PROGRESS NOTES
PULMONARY  NOTE    Chief Complaint     Abnormal CT scan of the chest, pulmonary nodule, tobacco abuse, emphysema    History of Present Illness     84-year-old male returns today for follow-up  I last saw him about a year ago    He has ongoing tobacco abuse  No plans for smoking cessation    He has had a pulmonary nodule followed since November 2017  It is pleural-based in the left lower lobe  There been a relatively slow interval increase over 4-year period of time from 2371-9109 going from 11 to 18 mm  Initial PET scan revealed no abnormal hypermetabolic activity  He has not desired further workup other than serial chest imaging  Most recent CT scan of the chest is as noted below    He has stage I obstructive airways disease on spirometry but very severe diffuse emphysematous changes on chest imaging  He has not found inhalers to be particularly beneficial  Has been on albuterol in the past but has not used recently    Patient Active Problem List   Diagnosis    Stage I emphysema    Tobacco abuse    Abnormal chest CT (~ 2cm pulmonary nodule LLL)    Decreased diffusion capacity probably related to emphysema    Renal artery aneurysm (17mm)      No Known Allergies    Current Outpatient Medications:     amoxapine (ASENDIN) 100 MG tablet, Take 1 tablet by mouth 3 (Three) Times a Day., Disp: , Rfl:     ciclopirox (PENLAC) 8 % solution, , Disp: , Rfl:     folic acid (FOLVITE) 1 MG tablet, Take 1 tablet every day by oral route., Disp: , Rfl:     glipiZIDE (GLUCOTROL) 10 MG tablet, Take 1 tablet by mouth Daily., Disp: , Rfl:     halobetasol (ULTRAVATE) 0.05 % cream, halobetasol propionate 0.05 % topical cream  APPLY A THIN LAYER TO THE AFFECTED AREA(S) BY TOPICAL ROUTE ONCE DAILY DO NOT EXCEED 50 GRAMS PER WEEK OR 2 WEEKS DURATION, Disp: , Rfl:     metFORMIN ER (GLUCOPHAGE-XR) 500 MG 24 hr tablet, Take 1 tablet by mouth 2 (Two) Times a Day., Disp: , Rfl:     SITagliptin (JANUVIA) 100 MG tablet, Take 1 tablet by mouth  "Daily., Disp: , Rfl:     dutasteride (AVODART) 0.5 MG capsule, , Disp: , Rfl:   MEDICATION LIST AND ALLERGIES REVIEWED.    Family History   Problem Relation Age of Onset    Kidney disease Mother     Parkinsonism Father     Parkinsonism Sister     Stroke Maternal Grandmother     Stroke Maternal Grandfather      Social History     Tobacco Use    Smoking status: Every Day     Current packs/day: 0.50     Average packs/day: 0.5 packs/day for 69.6 years (34.8 ttl pk-yrs)     Types: Cigarettes     Start date: [birth year]/15/1955    Smokeless tobacco: Never   Substance Use Topics    Alcohol use: No    Drug use: No     Social History     Social History Narrative        Is a retired     Has smoked up to 2 packs of cigarettes per day but has cut back to 6 cigarettes a day    Doesn't drink alcohol     FAMILY AND SOCIAL HISTORY REVIEWED.    Review of Systems  IF PRESENT REFER TO SCANNED ROS SHEET FROM SAME DATE  OTHERWISE ROS OBTAINED AND NON-CONTRIBUTORY OVER HPI.    /84   Pulse 95   Temp 96.9 °F (36.1 °C) (Infrared)   Ht 177.8 cm (70\")   Wt 59 kg (130 lb)   SpO2 99% Comment: room air at rest  BMI 18.65 kg/m²   Physical Exam  Vitals and nursing note reviewed.   Constitutional:       General: He is not in acute distress.     Appearance: He is well-developed. He is not diaphoretic.   HENT:      Head: Normocephalic and atraumatic.   Neck:      Thyroid: No thyromegaly.   Cardiovascular:      Rate and Rhythm: Normal rate and regular rhythm.      Heart sounds: Normal heart sounds. No murmur heard.  Pulmonary:      Effort: Pulmonary effort is normal.      Breath sounds: Normal breath sounds. No stridor.   Lymphadenopathy:      Cervical: No cervical adenopathy.      Upper Body:      Right upper body: No supraclavicular or epitrochlear adenopathy.      Left upper body: No supraclavicular or epitrochlear adenopathy.   Skin:     General: Skin is warm and dry.   Neurological:      Mental Status: He is alert and " oriented to person, place, and time.   Psychiatric:         Behavior: Behavior normal.         Results     CT scan of the chest from 10/21/2024 reviewed on PACS  Diffuse emphysematous changes with roughly stable pleural-based approximately 2 cm left lower lobe opacity    Immunization History   Administered Date(s) Administered    COVID-19 (MODERNA) 1st,2nd,3rd Dose Monovalent 02/18/2021, 03/18/2021, 11/03/2021    COVID-19 (MODERNA) BIVALENT 12+YRS 02/06/2023    Fluzone  >6mos 09/29/2020    Fluzone High-Dose 65+YRS 09/29/2017, 10/03/2019, 09/12/2024    Fluzone High-Dose 65+yrs 09/24/2021, 08/31/2023    Pneumococcal Conjugate 13-Valent (PCV13) 10/03/2019    Pneumococcal Polysaccharide (PPSV23) 10/01/2014     Problem List       ICD-10-CM ICD-9-CM   1. Abnormal chest CT (~ 2cm pulmonary nodule LLL)  R93.89 793.2   2. Decreased diffusion capacity probably related to emphysema  R94.2 794.2   3. Stage I emphysema  J44.9 496   4. Tobacco abuse  Z72.0 305.1       Discussion     We reviewed his chest imaging together on PACS  As noted previously the pulmonary nodule in the left lower lobe has been present for quite a while  It seemed to show some interval enlargement over 4-year period of time but then has been relatively stable since then  Initial PET scan revealed no abnormal hypermetabolic activity  He is chosen no further workup other than just serial chest imaging  Roughly stable on his most recent CT scan so I will see him back in a year with a repeat CT scan of the chest or earlier if there are any problems in the meantime    In the past he has not found any benefit from inhaled bronchodilators  We have discussed albuterol on an as needed basis    I will just see him back in a year or earlier if there are any problems in the meantime    This visit represents an established relationship with whom the provider is providing ongoing longitudinal care related to serious and/or complex conditions    Moderate level of Medical  Decision Making complexity based on 2 or more chronic stable illnesses and an independent review of test results and/or prescription drug management.    Jay Callahan MD  Note electronically signed    CC: Jose J Aguilera MD

## 2024-12-19 DIAGNOSIS — R93.89 ABNORMAL CHEST CT: ICD-10-CM

## 2024-12-19 DIAGNOSIS — R91.1 PULMONARY NODULE: ICD-10-CM

## 2024-12-19 DIAGNOSIS — Z87.891 PERSONAL HISTORY OF NICOTINE DEPENDENCE: Primary | ICD-10-CM
